# Patient Record
Sex: MALE | Race: WHITE | URBAN - METROPOLITAN AREA
[De-identification: names, ages, dates, MRNs, and addresses within clinical notes are randomized per-mention and may not be internally consistent; named-entity substitution may affect disease eponyms.]

---

## 2017-10-06 ENCOUNTER — EMERGENCY (EMERGENCY)
Facility: HOSPITAL | Age: 72
LOS: 1 days | Discharge: HOME | End: 2017-10-06
Attending: EMERGENCY MEDICINE
Payer: MEDICARE

## 2017-10-06 VITALS
OXYGEN SATURATION: 96 % | DIASTOLIC BLOOD PRESSURE: 71 MMHG | SYSTOLIC BLOOD PRESSURE: 132 MMHG | WEIGHT: 147.05 LBS | TEMPERATURE: 99 F | RESPIRATION RATE: 16 BRPM | HEIGHT: 62 IN | HEART RATE: 79 BPM

## 2017-10-06 VITALS
TEMPERATURE: 98 F | RESPIRATION RATE: 16 BRPM | DIASTOLIC BLOOD PRESSURE: 67 MMHG | HEART RATE: 67 BPM | SYSTOLIC BLOOD PRESSURE: 123 MMHG | OXYGEN SATURATION: 97 %

## 2017-10-06 DIAGNOSIS — E78.5 HYPERLIPIDEMIA, UNSPECIFIED: ICD-10-CM

## 2017-10-06 DIAGNOSIS — E11.9 TYPE 2 DIABETES MELLITUS WITHOUT COMPLICATIONS: ICD-10-CM

## 2017-10-06 DIAGNOSIS — Z79.84 LONG TERM (CURRENT) USE OF ORAL HYPOGLYCEMIC DRUGS: ICD-10-CM

## 2017-10-06 DIAGNOSIS — G44.209 TENSION-TYPE HEADACHE, UNSPECIFIED, NOT INTRACTABLE: ICD-10-CM

## 2017-10-06 DIAGNOSIS — I10 ESSENTIAL (PRIMARY) HYPERTENSION: ICD-10-CM

## 2017-10-06 PROCEDURE — 99285 EMERGENCY DEPT VISIT HI MDM: CPT

## 2017-10-06 PROCEDURE — 99284 EMERGENCY DEPT VISIT MOD MDM: CPT | Mod: 25

## 2017-10-06 PROCEDURE — 96375 TX/PRO/DX INJ NEW DRUG ADDON: CPT

## 2017-10-06 PROCEDURE — 70450 CT HEAD/BRAIN W/O DYE: CPT | Mod: 26

## 2017-10-06 PROCEDURE — 70450 CT HEAD/BRAIN W/O DYE: CPT

## 2017-10-06 PROCEDURE — 96374 THER/PROPH/DIAG INJ IV PUSH: CPT

## 2017-10-06 RX ORDER — PROCHLORPERAZINE MALEATE 5 MG
10 TABLET ORAL ONCE
Qty: 0 | Refills: 0 | Status: DISCONTINUED | OUTPATIENT
Start: 2017-10-06 | End: 2017-10-06

## 2017-10-06 RX ORDER — ACETAMINOPHEN 500 MG
650 TABLET ORAL ONCE
Qty: 0 | Refills: 0 | Status: COMPLETED | OUTPATIENT
Start: 2017-10-06 | End: 2017-10-06

## 2017-10-06 RX ORDER — METOCLOPRAMIDE HCL 10 MG
10 TABLET ORAL ONCE
Qty: 0 | Refills: 0 | Status: COMPLETED | OUTPATIENT
Start: 2017-10-06 | End: 2017-10-06

## 2017-10-06 RX ADMIN — Medication 650 MILLIGRAM(S): at 12:00

## 2017-10-06 RX ADMIN — Medication 10 MILLIGRAM(S): at 12:01

## 2017-10-06 NOTE — ED ADULT NURSE NOTE - PSH
History of inguinal hernia repair  left and right  History of total hip replacement  right- 2005  History of transurethral resection of prostate  2012

## 2017-10-06 NOTE — ED PROVIDER NOTE - MEDICAL DECISION MAKING DETAILS
patient pw ha and neck strain likely tension related. ct scan reassuring. patient feeling better after treatemnt. will dc home with children.

## 2017-10-06 NOTE — ED ADULT NURSE NOTE - OBJECTIVE STATEMENT
73 y/o male came in with c/o headache/neck pain x 2 days. denies any sensitivity to light pt resting at this time with no  signs of any distress noted .

## 2017-10-06 NOTE — ED PROVIDER NOTE - OBJECTIVE STATEMENT
Pertinent PMH/PSH/FHx/SHx and Review of Systems contained within:  72m hx of htn pw ha x2 days mostly posterior and radiating down into the neck and shoulders. notes pain when trying to turn head in the neck muscles. no vision loss, no sensation loss, no difficulty speaking. no nausea, vomiting or fever  Fh and Sh not otherwise contributory  ROS otherwise negative

## 2017-10-06 NOTE — ED ADULT NURSE NOTE - PMH
BPH (benign prostatic hypertrophy)    Diabetes mellitus type II    Diabetic neuropathy    GERD (gastroesophageal reflux disease)    HTN (hypertension)    Hyperlipidemia    Kidney stones    Primary osteoarthritis of right hip

## 2020-01-01 ENCOUNTER — OUTPATIENT (OUTPATIENT)
Dept: OUTPATIENT SERVICES | Facility: HOSPITAL | Age: 75
LOS: 1 days | End: 2020-01-01

## 2020-01-01 ENCOUNTER — INPATIENT (INPATIENT)
Facility: HOSPITAL | Age: 75
LOS: 5 days | Discharge: ANOTHER HOSPITAL | DRG: 871 | End: 2020-04-04
Attending: INTERNAL MEDICINE | Admitting: INTERNAL MEDICINE
Payer: MEDICARE

## 2020-01-01 ENCOUNTER — INPATIENT (INPATIENT)
Facility: HOSPITAL | Age: 75
LOS: 7 days | End: 2020-04-12
Attending: INTERNAL MEDICINE
Payer: MEDICAID

## 2020-01-01 VITALS
OXYGEN SATURATION: 86 % | DIASTOLIC BLOOD PRESSURE: 75 MMHG | HEIGHT: 65 IN | WEIGHT: 139.99 LBS | RESPIRATION RATE: 24 BRPM | SYSTOLIC BLOOD PRESSURE: 146 MMHG | HEART RATE: 119 BPM

## 2020-01-01 VITALS
DIASTOLIC BLOOD PRESSURE: 57 MMHG | HEART RATE: 93 BPM | WEIGHT: 151.46 LBS | SYSTOLIC BLOOD PRESSURE: 124 MMHG | OXYGEN SATURATION: 94 %

## 2020-01-01 DIAGNOSIS — Z29.9 ENCOUNTER FOR PROPHYLACTIC MEASURES, UNSPECIFIED: ICD-10-CM

## 2020-01-01 DIAGNOSIS — N17.9 ACUTE KIDNEY FAILURE, UNSPECIFIED: ICD-10-CM

## 2020-01-01 DIAGNOSIS — J12.9 VIRAL PNEUMONIA, UNSPECIFIED: ICD-10-CM

## 2020-01-01 DIAGNOSIS — J96.01 ACUTE RESPIRATORY FAILURE WITH HYPOXIA: ICD-10-CM

## 2020-01-01 DIAGNOSIS — N40.0 BENIGN PROSTATIC HYPERPLASIA WITHOUT LOWER URINARY TRACT SYMPTOMS: ICD-10-CM

## 2020-01-01 DIAGNOSIS — K21.9 GASTRO-ESOPHAGEAL REFLUX DISEASE WITHOUT ESOPHAGITIS: ICD-10-CM

## 2020-01-01 DIAGNOSIS — F03.90 UNSPECIFIED DEMENTIA WITHOUT BEHAVIORAL DISTURBANCE: ICD-10-CM

## 2020-01-01 DIAGNOSIS — E11.9 TYPE 2 DIABETES MELLITUS WITHOUT COMPLICATIONS: ICD-10-CM

## 2020-01-01 DIAGNOSIS — R09.02 HYPOXEMIA: ICD-10-CM

## 2020-01-01 LAB
24R-OH-CALCIDIOL SERPL-MCNC: 15 NG/ML — LOW (ref 30–80)
ALBUMIN SERPL ELPH-MCNC: 1.2 G/DL — LOW (ref 3.5–5)
ALBUMIN SERPL ELPH-MCNC: 1.4 G/DL — LOW (ref 3.5–5)
ALBUMIN SERPL ELPH-MCNC: 1.6 G/DL — LOW (ref 3.5–5)
ALBUMIN SERPL ELPH-MCNC: 1.9 G/DL — LOW (ref 3.5–5)
ALBUMIN SERPL ELPH-MCNC: 2.2 G/DL — LOW (ref 3.5–5)
ALBUMIN SERPL ELPH-MCNC: 2.3 G/DL — LOW (ref 3.5–5)
ALP SERPL-CCNC: 101 U/L — SIGNIFICANT CHANGE UP (ref 40–120)
ALP SERPL-CCNC: 106 U/L — SIGNIFICANT CHANGE UP (ref 40–120)
ALP SERPL-CCNC: 109 U/L — SIGNIFICANT CHANGE UP (ref 40–120)
ALP SERPL-CCNC: 142 U/L — HIGH (ref 40–120)
ALP SERPL-CCNC: 144 U/L — HIGH (ref 40–120)
ALP SERPL-CCNC: 187 U/L — HIGH (ref 40–120)
ALT FLD-CCNC: 23 U/L DA — SIGNIFICANT CHANGE UP (ref 10–60)
ALT FLD-CCNC: 26 U/L DA — SIGNIFICANT CHANGE UP (ref 10–60)
ALT FLD-CCNC: 35 U/L DA — SIGNIFICANT CHANGE UP (ref 10–60)
ALT FLD-CCNC: 36 U/L DA — SIGNIFICANT CHANGE UP (ref 10–60)
ALT FLD-CCNC: 57 U/L DA — SIGNIFICANT CHANGE UP (ref 10–60)
ALT FLD-CCNC: 66 U/L DA — HIGH (ref 10–60)
ANION GAP SERPL CALC-SCNC: 11 MMOL/L — SIGNIFICANT CHANGE UP (ref 5–17)
ANION GAP SERPL CALC-SCNC: 11 MMOL/L — SIGNIFICANT CHANGE UP (ref 5–17)
ANION GAP SERPL CALC-SCNC: 13 MMOL/L — SIGNIFICANT CHANGE UP (ref 5–17)
ANION GAP SERPL CALC-SCNC: 13 MMOL/L — SIGNIFICANT CHANGE UP (ref 5–17)
ANION GAP SERPL CALC-SCNC: 14 MMOL/L — SIGNIFICANT CHANGE UP (ref 5–17)
ANION GAP SERPL CALC-SCNC: 14 MMOL/L — SIGNIFICANT CHANGE UP (ref 5–17)
ANISOCYTOSIS BLD QL: SLIGHT — SIGNIFICANT CHANGE UP
APPEARANCE UR: CLEAR — SIGNIFICANT CHANGE UP
APTT BLD: 33.6 SEC — SIGNIFICANT CHANGE UP (ref 27.5–36.3)
AST SERPL-CCNC: 126 U/L — HIGH (ref 10–40)
AST SERPL-CCNC: 41 U/L — HIGH (ref 10–40)
AST SERPL-CCNC: 43 U/L — HIGH (ref 10–40)
AST SERPL-CCNC: 59 U/L — HIGH (ref 10–40)
AST SERPL-CCNC: 69 U/L — HIGH (ref 10–40)
AST SERPL-CCNC: 96 U/L — HIGH (ref 10–40)
BASE EXCESS BLDA CALC-SCNC: -1.3 MMOL/L — SIGNIFICANT CHANGE UP (ref -2–2)
BASE EXCESS BLDA CALC-SCNC: -1.4 MMOL/L — SIGNIFICANT CHANGE UP (ref -2–2)
BASE EXCESS BLDA CALC-SCNC: -5.1 MMOL/L — LOW (ref -2–2)
BASE EXCESS BLDA CALC-SCNC: -6.1 MMOL/L — LOW (ref -2–2)
BASE EXCESS BLDA CALC-SCNC: -6.5 MMOL/L — LOW (ref -2–2)
BASE EXCESS BLDA CALC-SCNC: -7.9 MMOL/L — LOW (ref -2–2)
BASE EXCESS BLDA CALC-SCNC: -8.6 MMOL/L — LOW (ref -2–2)
BASE EXCESS BLDA CALC-SCNC: -9.5 MMOL/L — LOW (ref -2–2)
BASE EXCESS BLDA CALC-SCNC: 0.8 MMOL/L — SIGNIFICANT CHANGE UP (ref -2–2)
BASE EXCESS BLDA CALC-SCNC: SIGNIFICANT CHANGE UP MMOL/L (ref -2–2)
BASOPHILS # BLD AUTO: 0.01 K/UL — SIGNIFICANT CHANGE UP (ref 0–0.2)
BASOPHILS # BLD AUTO: 0.02 K/UL — SIGNIFICANT CHANGE UP (ref 0–0.2)
BASOPHILS # BLD AUTO: 0.03 K/UL — SIGNIFICANT CHANGE UP (ref 0–0.2)
BASOPHILS NFR BLD AUTO: 0.1 % — SIGNIFICANT CHANGE UP (ref 0–2)
BASOPHILS NFR BLD AUTO: 0.2 % — SIGNIFICANT CHANGE UP (ref 0–2)
BASOPHILS NFR BLD AUTO: 0.3 % — SIGNIFICANT CHANGE UP (ref 0–2)
BILIRUB SERPL-MCNC: 0.7 MG/DL — SIGNIFICANT CHANGE UP (ref 0.2–1.2)
BILIRUB SERPL-MCNC: 0.8 MG/DL — SIGNIFICANT CHANGE UP (ref 0.2–1.2)
BILIRUB SERPL-MCNC: 0.8 MG/DL — SIGNIFICANT CHANGE UP (ref 0.2–1.2)
BILIRUB SERPL-MCNC: 1 MG/DL — SIGNIFICANT CHANGE UP (ref 0.2–1.2)
BILIRUB UR-MCNC: NEGATIVE — SIGNIFICANT CHANGE UP
BLOOD GAS COMMENTS ARTERIAL: SIGNIFICANT CHANGE UP
BUN SERPL-MCNC: 27 MG/DL — HIGH (ref 7–18)
BUN SERPL-MCNC: 37 MG/DL — HIGH (ref 7–18)
BUN SERPL-MCNC: 37 MG/DL — HIGH (ref 7–18)
BUN SERPL-MCNC: 38 MG/DL — HIGH (ref 7–18)
BUN SERPL-MCNC: 49 MG/DL — HIGH (ref 7–18)
BUN SERPL-MCNC: 74 MG/DL — HIGH (ref 7–18)
CALCIUM SERPL-MCNC: 7.8 MG/DL — LOW (ref 8.4–10.5)
CALCIUM SERPL-MCNC: 7.8 MG/DL — LOW (ref 8.4–10.5)
CALCIUM SERPL-MCNC: 8 MG/DL — LOW (ref 8.4–10.5)
CALCIUM SERPL-MCNC: 8.3 MG/DL — LOW (ref 8.4–10.5)
CALCIUM SERPL-MCNC: 8.4 MG/DL — SIGNIFICANT CHANGE UP (ref 8.4–10.5)
CALCIUM SERPL-MCNC: 8.7 MG/DL — SIGNIFICANT CHANGE UP (ref 8.4–10.5)
CHLORIDE SERPL-SCNC: 102 MMOL/L — SIGNIFICANT CHANGE UP (ref 96–108)
CHLORIDE SERPL-SCNC: 103 MMOL/L — SIGNIFICANT CHANGE UP (ref 96–108)
CHLORIDE SERPL-SCNC: 111 MMOL/L — HIGH (ref 96–108)
CHLORIDE SERPL-SCNC: 112 MMOL/L — HIGH (ref 96–108)
CHOLEST SERPL-MCNC: 111 MG/DL — SIGNIFICANT CHANGE UP (ref 10–199)
CO2 SERPL-SCNC: 16 MMOL/L — LOW (ref 22–31)
CO2 SERPL-SCNC: 17 MMOL/L — LOW (ref 22–31)
CO2 SERPL-SCNC: 17 MMOL/L — LOW (ref 22–31)
CO2 SERPL-SCNC: 19 MMOL/L — LOW (ref 22–31)
CO2 SERPL-SCNC: 20 MMOL/L — LOW (ref 22–31)
CO2 SERPL-SCNC: 23 MMOL/L — SIGNIFICANT CHANGE UP (ref 22–31)
COLOR SPEC: YELLOW — SIGNIFICANT CHANGE UP
CREAT ?TM UR-MCNC: 130 MG/DL — SIGNIFICANT CHANGE UP
CREAT SERPL-MCNC: 1.1 MG/DL — SIGNIFICANT CHANGE UP (ref 0.5–1.3)
CREAT SERPL-MCNC: 1.17 MG/DL — SIGNIFICANT CHANGE UP (ref 0.5–1.3)
CREAT SERPL-MCNC: 1.54 MG/DL — HIGH (ref 0.5–1.3)
CREAT SERPL-MCNC: 2.18 MG/DL — HIGH (ref 0.5–1.3)
CREAT SERPL-MCNC: 3.4 MG/DL — HIGH (ref 0.5–1.3)
CREAT SERPL-MCNC: 4.05 MG/DL — HIGH (ref 0.5–1.3)
CRP SERPL-MCNC: 23.05 MG/DL — HIGH (ref 0–0.4)
CRP SERPL-MCNC: 32.89 MG/DL — HIGH (ref 0–0.4)
CRP SERPL-MCNC: 34.24 MG/DL — HIGH (ref 0–0.4)
CRP SERPL-MCNC: 37.93 MG/DL — HIGH (ref 0–0.4)
CULTURE RESULTS: SIGNIFICANT CHANGE UP
D DIMER BLD IA.RAPID-MCNC: 2493 NG/ML DDU — HIGH
D DIMER BLD IA.RAPID-MCNC: 2895 NG/ML DDU — HIGH
DIFF PNL FLD: ABNORMAL
EOSINOPHIL # BLD AUTO: 0 K/UL — SIGNIFICANT CHANGE UP (ref 0–0.5)
EOSINOPHIL # BLD AUTO: 0 K/UL — SIGNIFICANT CHANGE UP (ref 0–0.5)
EOSINOPHIL # BLD AUTO: 0.01 K/UL — SIGNIFICANT CHANGE UP (ref 0–0.5)
EOSINOPHIL # BLD AUTO: 0.01 K/UL — SIGNIFICANT CHANGE UP (ref 0–0.5)
EOSINOPHIL # BLD AUTO: 0.21 K/UL — SIGNIFICANT CHANGE UP (ref 0–0.5)
EOSINOPHIL NFR BLD AUTO: 0 % — SIGNIFICANT CHANGE UP (ref 0–6)
EOSINOPHIL NFR BLD AUTO: 0 % — SIGNIFICANT CHANGE UP (ref 0–6)
EOSINOPHIL NFR BLD AUTO: 0.1 % — SIGNIFICANT CHANGE UP (ref 0–6)
EOSINOPHIL NFR BLD AUTO: 0.1 % — SIGNIFICANT CHANGE UP (ref 0–6)
EOSINOPHIL NFR BLD AUTO: 2.2 % — SIGNIFICANT CHANGE UP (ref 0–6)
ERYTHROCYTE [SEDIMENTATION RATE] IN BLOOD: 54 MM/HR — HIGH (ref 0–20)
ERYTHROCYTE [SEDIMENTATION RATE] IN BLOOD: 91 MM/HR — HIGH (ref 0–20)
FERRITIN SERPL-MCNC: 6255 NG/ML — HIGH (ref 30–400)
FLU A RESULT: SIGNIFICANT CHANGE UP
FLU A RESULT: SIGNIFICANT CHANGE UP
FLUAV AG NPH QL: SIGNIFICANT CHANGE UP
FLUBV AG NPH QL: SIGNIFICANT CHANGE UP
FOLATE SERPL-MCNC: 17.1 NG/ML — SIGNIFICANT CHANGE UP
GIANT PLATELETS BLD QL SMEAR: PRESENT — SIGNIFICANT CHANGE UP
GLUCOSE BLDC GLUCOMTR-MCNC: 100 MG/DL — HIGH (ref 70–99)
GLUCOSE BLDC GLUCOMTR-MCNC: 103 MG/DL — HIGH (ref 70–99)
GLUCOSE BLDC GLUCOMTR-MCNC: 111 MG/DL — HIGH (ref 70–99)
GLUCOSE BLDC GLUCOMTR-MCNC: 116 MG/DL — HIGH (ref 70–99)
GLUCOSE BLDC GLUCOMTR-MCNC: 121 MG/DL — HIGH (ref 70–99)
GLUCOSE BLDC GLUCOMTR-MCNC: 122 MG/DL — HIGH (ref 70–99)
GLUCOSE BLDC GLUCOMTR-MCNC: 207 MG/DL — HIGH (ref 70–99)
GLUCOSE BLDC GLUCOMTR-MCNC: 233 MG/DL — HIGH (ref 70–99)
GLUCOSE BLDC GLUCOMTR-MCNC: 263 MG/DL — HIGH (ref 70–99)
GLUCOSE BLDC GLUCOMTR-MCNC: 320 MG/DL — HIGH (ref 70–99)
GLUCOSE SERPL-MCNC: 110 MG/DL — HIGH (ref 70–99)
GLUCOSE SERPL-MCNC: 158 MG/DL — HIGH (ref 70–99)
GLUCOSE SERPL-MCNC: 167 MG/DL — HIGH (ref 70–99)
GLUCOSE SERPL-MCNC: 288 MG/DL — HIGH (ref 70–99)
GLUCOSE SERPL-MCNC: 295 MG/DL — HIGH (ref 70–99)
GLUCOSE SERPL-MCNC: 340 MG/DL — HIGH (ref 70–99)
GLUCOSE UR QL: 1000 MG/DL
HBA1C BLD-MCNC: 10.9 % — HIGH (ref 4–5.6)
HBA1C BLD-MCNC: 11.4 % — HIGH (ref 4–5.6)
HCO3 BLDA-SCNC: 18 MMOL/L — LOW (ref 23–27)
HCO3 BLDA-SCNC: 18 MMOL/L — LOW (ref 23–27)
HCO3 BLDA-SCNC: 19 MMOL/L — LOW (ref 23–27)
HCO3 BLDA-SCNC: 19 MMOL/L — LOW (ref 23–27)
HCO3 BLDA-SCNC: 20 MMOL/L — LOW (ref 23–27)
HCO3 BLDA-SCNC: 21 MMOL/L — LOW (ref 23–27)
HCO3 BLDA-SCNC: 23 MMOL/L — SIGNIFICANT CHANGE UP (ref 23–27)
HCT VFR BLD CALC: 37.7 % — LOW (ref 39–50)
HCT VFR BLD CALC: 42.3 % — SIGNIFICANT CHANGE UP (ref 39–50)
HCT VFR BLD CALC: 45.1 % — SIGNIFICANT CHANGE UP (ref 39–50)
HCT VFR BLD CALC: 45.8 % — SIGNIFICANT CHANGE UP (ref 39–50)
HCT VFR BLD CALC: 48.5 % — SIGNIFICANT CHANGE UP (ref 39–50)
HCT VFR BLD CALC: 49.4 % — SIGNIFICANT CHANGE UP (ref 39–50)
HCV AB S/CO SERPL IA: 0.13 S/CO — SIGNIFICANT CHANGE UP (ref 0–0.99)
HCV AB SERPL-IMP: SIGNIFICANT CHANGE UP
HDLC SERPL-MCNC: 19 MG/DL — LOW
HGB BLD-MCNC: 12 G/DL — LOW (ref 13–17)
HGB BLD-MCNC: 13 G/DL — SIGNIFICANT CHANGE UP (ref 13–17)
HGB BLD-MCNC: 13.9 G/DL — SIGNIFICANT CHANGE UP (ref 13–17)
HGB BLD-MCNC: 15.2 G/DL — SIGNIFICANT CHANGE UP (ref 13–17)
HGB BLD-MCNC: 16.3 G/DL — SIGNIFICANT CHANGE UP (ref 13–17)
HGB BLD-MCNC: 16.5 G/DL — SIGNIFICANT CHANGE UP (ref 13–17)
HOROWITZ INDEX BLDA+IHG-RTO: 100 — SIGNIFICANT CHANGE UP
HOROWITZ INDEX BLDA+IHG-RTO: 40 — SIGNIFICANT CHANGE UP
HOROWITZ INDEX BLDA+IHG-RTO: 50 — SIGNIFICANT CHANGE UP
HOROWITZ INDEX BLDA+IHG-RTO: 60 — SIGNIFICANT CHANGE UP
IMM GRANULOCYTES NFR BLD AUTO: 1.7 % — HIGH (ref 0–1.5)
IMM GRANULOCYTES NFR BLD AUTO: 1.8 % — HIGH (ref 0–1.5)
IMM GRANULOCYTES NFR BLD AUTO: 2.3 % — HIGH (ref 0–1.5)
IMM GRANULOCYTES NFR BLD AUTO: 2.6 % — HIGH (ref 0–1.5)
IMM GRANULOCYTES NFR BLD AUTO: 3.2 % — HIGH (ref 0–1.5)
INR BLD: 1.19 RATIO — HIGH (ref 0.88–1.16)
KETONES UR-MCNC: ABNORMAL
LACTATE SERPL-SCNC: 2.1 MMOL/L — HIGH (ref 0.7–2)
LDH SERPL L TO P-CCNC: 788 U/L — HIGH (ref 120–225)
LEUKOCYTE ESTERASE UR-ACNC: NEGATIVE — SIGNIFICANT CHANGE UP
LIPID PNL WITH DIRECT LDL SERPL: 48 MG/DL — SIGNIFICANT CHANGE UP
LYMPHOCYTES # BLD AUTO: 0.26 K/UL — LOW (ref 1–3.3)
LYMPHOCYTES # BLD AUTO: 0.63 K/UL — LOW (ref 1–3.3)
LYMPHOCYTES # BLD AUTO: 0.85 K/UL — LOW (ref 1–3.3)
LYMPHOCYTES # BLD AUTO: 0.91 K/UL — LOW (ref 1–3.3)
LYMPHOCYTES # BLD AUTO: 0.96 K/UL — LOW (ref 1–3.3)
LYMPHOCYTES # BLD AUTO: 11.1 % — LOW (ref 13–44)
LYMPHOCYTES # BLD AUTO: 11.6 % — LOW (ref 13–44)
LYMPHOCYTES # BLD AUTO: 13.7 % — SIGNIFICANT CHANGE UP (ref 13–44)
LYMPHOCYTES # BLD AUTO: 2.8 % — LOW (ref 13–44)
LYMPHOCYTES # BLD AUTO: 6.7 % — LOW (ref 13–44)
MACROCYTES BLD QL: SLIGHT — SIGNIFICANT CHANGE UP
MAGNESIUM SERPL-MCNC: 2.7 MG/DL — HIGH (ref 1.6–2.6)
MAGNESIUM SERPL-MCNC: 2.8 MG/DL — HIGH (ref 1.6–2.6)
MAGNESIUM SERPL-MCNC: 2.8 MG/DL — HIGH (ref 1.6–2.6)
MAGNESIUM SERPL-MCNC: 3 MG/DL — HIGH (ref 1.6–2.6)
MAGNESIUM SERPL-MCNC: 3.2 MG/DL — HIGH (ref 1.6–2.6)
MANUAL SMEAR VERIFICATION: SIGNIFICANT CHANGE UP
MCHC RBC-ENTMCNC: 29.3 PG — SIGNIFICANT CHANGE UP (ref 27–34)
MCHC RBC-ENTMCNC: 29.6 PG — SIGNIFICANT CHANGE UP (ref 27–34)
MCHC RBC-ENTMCNC: 29.7 PG — SIGNIFICANT CHANGE UP (ref 27–34)
MCHC RBC-ENTMCNC: 29.7 PG — SIGNIFICANT CHANGE UP (ref 27–34)
MCHC RBC-ENTMCNC: 29.9 PG — SIGNIFICANT CHANGE UP (ref 27–34)
MCHC RBC-ENTMCNC: 30.1 PG — SIGNIFICANT CHANGE UP (ref 27–34)
MCHC RBC-ENTMCNC: 30.7 GM/DL — LOW (ref 32–36)
MCHC RBC-ENTMCNC: 30.8 GM/DL — LOW (ref 32–36)
MCHC RBC-ENTMCNC: 31.8 GM/DL — LOW (ref 32–36)
MCHC RBC-ENTMCNC: 33.2 GM/DL — SIGNIFICANT CHANGE UP (ref 32–36)
MCHC RBC-ENTMCNC: 33.4 GM/DL — SIGNIFICANT CHANGE UP (ref 32–36)
MCHC RBC-ENTMCNC: 33.6 GM/DL — SIGNIFICANT CHANGE UP (ref 32–36)
MCV RBC AUTO: 88.2 FL — SIGNIFICANT CHANGE UP (ref 80–100)
MCV RBC AUTO: 90 FL — SIGNIFICANT CHANGE UP (ref 80–100)
MCV RBC AUTO: 90 FL — SIGNIFICANT CHANGE UP (ref 80–100)
MCV RBC AUTO: 93.3 FL — SIGNIFICANT CHANGE UP (ref 80–100)
MCV RBC AUTO: 95.1 FL — SIGNIFICANT CHANGE UP (ref 80–100)
MCV RBC AUTO: 96.8 FL — SIGNIFICANT CHANGE UP (ref 80–100)
MICROCYTES BLD QL: SLIGHT — SIGNIFICANT CHANGE UP
MONOCYTES # BLD AUTO: 0.4 K/UL — SIGNIFICANT CHANGE UP (ref 0–0.9)
MONOCYTES # BLD AUTO: 0.48 K/UL — SIGNIFICANT CHANGE UP (ref 0–0.9)
MONOCYTES # BLD AUTO: 0.61 K/UL — SIGNIFICANT CHANGE UP (ref 0–0.9)
MONOCYTES # BLD AUTO: 0.68 K/UL — SIGNIFICANT CHANGE UP (ref 0–0.9)
MONOCYTES # BLD AUTO: 0.68 K/UL — SIGNIFICANT CHANGE UP (ref 0–0.9)
MONOCYTES NFR BLD AUTO: 10.2 % — SIGNIFICANT CHANGE UP (ref 2–14)
MONOCYTES NFR BLD AUTO: 4.3 % — SIGNIFICANT CHANGE UP (ref 2–14)
MONOCYTES NFR BLD AUTO: 6.3 % — SIGNIFICANT CHANGE UP (ref 2–14)
MONOCYTES NFR BLD AUTO: 7.2 % — SIGNIFICANT CHANGE UP (ref 2–14)
MONOCYTES NFR BLD AUTO: 7.4 % — SIGNIFICANT CHANGE UP (ref 2–14)
NEUTROPHILS # BLD AUTO: 4.91 K/UL — SIGNIFICANT CHANGE UP (ref 1.8–7.4)
NEUTROPHILS # BLD AUTO: 6.12 K/UL — SIGNIFICANT CHANGE UP (ref 1.8–7.4)
NEUTROPHILS # BLD AUTO: 6.52 K/UL — SIGNIFICANT CHANGE UP (ref 1.8–7.4)
NEUTROPHILS # BLD AUTO: 7.56 K/UL — HIGH (ref 1.8–7.4)
NEUTROPHILS # BLD AUTO: 8.48 K/UL — HIGH (ref 1.8–7.4)
NEUTROPHILS NFR BLD AUTO: 74 % — SIGNIFICANT CHANGE UP (ref 43–77)
NEUTROPHILS NFR BLD AUTO: 79 % — HIGH (ref 43–77)
NEUTROPHILS NFR BLD AUTO: 79.9 % — HIGH (ref 43–77)
NEUTROPHILS NFR BLD AUTO: 80.4 % — HIGH (ref 43–77)
NEUTROPHILS NFR BLD AUTO: 90.2 % — HIGH (ref 43–77)
NITRITE UR-MCNC: NEGATIVE — SIGNIFICANT CHANGE UP
NRBC # BLD: 0 /100 WBCS — SIGNIFICANT CHANGE UP (ref 0–0)
PCO2 BLDA: 27 MMHG — LOW (ref 32–46)
PCO2 BLDA: 28 MMHG — LOW (ref 32–46)
PCO2 BLDA: 32 MMHG — SIGNIFICANT CHANGE UP (ref 32–46)
PCO2 BLDA: 37 MMHG — SIGNIFICANT CHANGE UP (ref 32–46)
PCO2 BLDA: 38 MMHG — SIGNIFICANT CHANGE UP (ref 32–46)
PCO2 BLDA: 40 MMHG — SIGNIFICANT CHANGE UP (ref 32–46)
PCO2 BLDA: 48 MMHG — HIGH (ref 32–46)
PCO2 BLDA: 48 MMHG — HIGH (ref 32–46)
PCO2 BLDA: 50 MMHG — HIGH (ref 32–46)
PCO2 BLDA: 52 MMHG — HIGH (ref 32–46)
PH BLDA: 7.2 — CRITICAL LOW (ref 7.35–7.45)
PH BLDA: 7.2 — CRITICAL LOW (ref 7.35–7.45)
PH BLDA: 7.21 — LOW (ref 7.35–7.45)
PH BLDA: 7.26 — LOW (ref 7.35–7.45)
PH BLDA: 7.26 — LOW (ref 7.35–7.45)
PH BLDA: 7.31 — LOW (ref 7.35–7.45)
PH BLDA: 7.34 — LOW (ref 7.35–7.45)
PH BLDA: 7.47 — HIGH (ref 7.35–7.45)
PH BLDA: 7.48 — HIGH (ref 7.35–7.45)
PH BLDA: 7.49 — HIGH (ref 7.35–7.45)
PH UR: 5 — SIGNIFICANT CHANGE UP (ref 5–8)
PHOSPHATE SERPL-MCNC: 3.1 MG/DL — SIGNIFICANT CHANGE UP (ref 2.5–4.5)
PHOSPHATE SERPL-MCNC: 3.6 MG/DL — SIGNIFICANT CHANGE UP (ref 2.5–4.5)
PHOSPHATE SERPL-MCNC: 5.7 MG/DL — HIGH (ref 2.5–4.5)
PHOSPHATE SERPL-MCNC: 6 MG/DL — HIGH (ref 2.5–4.5)
PHOSPHATE SERPL-MCNC: 6.1 MG/DL — HIGH (ref 2.5–4.5)
PLAT MORPH BLD: NORMAL — SIGNIFICANT CHANGE UP
PLATELET # BLD AUTO: 286 K/UL — SIGNIFICANT CHANGE UP (ref 150–400)
PLATELET # BLD AUTO: 333 K/UL — SIGNIFICANT CHANGE UP (ref 150–400)
PLATELET # BLD AUTO: 420 K/UL — HIGH (ref 150–400)
PLATELET # BLD AUTO: 483 K/UL — HIGH (ref 150–400)
PLATELET # BLD AUTO: 524 K/UL — HIGH (ref 150–400)
PLATELET # BLD AUTO: 552 K/UL — HIGH (ref 150–400)
PO2 BLDA: 118 MMHG — HIGH (ref 74–108)
PO2 BLDA: 258 MMHG — HIGH (ref 74–108)
PO2 BLDA: 41 MMHG — CRITICAL LOW (ref 74–108)
PO2 BLDA: 67 MMHG — LOW (ref 74–108)
PO2 BLDA: 71 MMHG — LOW (ref 74–108)
PO2 BLDA: 79 MMHG — SIGNIFICANT CHANGE UP (ref 74–108)
PO2 BLDA: 85 MMHG — SIGNIFICANT CHANGE UP (ref 74–108)
PO2 BLDA: 92 MMHG — SIGNIFICANT CHANGE UP (ref 74–108)
PO2 BLDA: 94 MMHG — SIGNIFICANT CHANGE UP (ref 74–108)
PO2 BLDA: 98 MMHG — SIGNIFICANT CHANGE UP (ref 74–108)
POIKILOCYTOSIS BLD QL AUTO: SLIGHT — SIGNIFICANT CHANGE UP
POTASSIUM SERPL-MCNC: 4.1 MMOL/L — SIGNIFICANT CHANGE UP (ref 3.5–5.3)
POTASSIUM SERPL-MCNC: 4.6 MMOL/L — SIGNIFICANT CHANGE UP (ref 3.5–5.3)
POTASSIUM SERPL-MCNC: 5.2 MMOL/L — SIGNIFICANT CHANGE UP (ref 3.5–5.3)
POTASSIUM SERPL-SCNC: 4.1 MMOL/L — SIGNIFICANT CHANGE UP (ref 3.5–5.3)
POTASSIUM SERPL-SCNC: 4.6 MMOL/L — SIGNIFICANT CHANGE UP (ref 3.5–5.3)
POTASSIUM SERPL-SCNC: 5.2 MMOL/L — SIGNIFICANT CHANGE UP (ref 3.5–5.3)
PROT SERPL-MCNC: 6 G/DL — SIGNIFICANT CHANGE UP (ref 6–8.3)
PROT SERPL-MCNC: 6.1 G/DL — SIGNIFICANT CHANGE UP (ref 6–8.3)
PROT SERPL-MCNC: 6.4 G/DL — SIGNIFICANT CHANGE UP (ref 6–8.3)
PROT SERPL-MCNC: 6.5 G/DL — SIGNIFICANT CHANGE UP (ref 6–8.3)
PROT SERPL-MCNC: 6.8 G/DL — SIGNIFICANT CHANGE UP (ref 6–8.3)
PROT SERPL-MCNC: 7.3 G/DL — SIGNIFICANT CHANGE UP (ref 6–8.3)
PROT UR-MCNC: 100
PROTHROM AB SERPL-ACNC: 13.5 SEC — HIGH (ref 10–12.9)
RBC # BLD: 4.04 M/UL — LOW (ref 4.2–5.8)
RBC # BLD: 4.37 M/UL — SIGNIFICANT CHANGE UP (ref 4.2–5.8)
RBC # BLD: 4.74 M/UL — SIGNIFICANT CHANGE UP (ref 4.2–5.8)
RBC # BLD: 5.09 M/UL — SIGNIFICANT CHANGE UP (ref 4.2–5.8)
RBC # BLD: 5.49 M/UL — SIGNIFICANT CHANGE UP (ref 4.2–5.8)
RBC # BLD: 5.5 M/UL — SIGNIFICANT CHANGE UP (ref 4.2–5.8)
RBC # FLD: 14.2 % — SIGNIFICANT CHANGE UP (ref 10.3–14.5)
RBC # FLD: 14.6 % — HIGH (ref 10.3–14.5)
RBC # FLD: 14.6 % — HIGH (ref 10.3–14.5)
RBC # FLD: 15.9 % — HIGH (ref 10.3–14.5)
RBC # FLD: 16.3 % — HIGH (ref 10.3–14.5)
RBC # FLD: 16.5 % — HIGH (ref 10.3–14.5)
RBC BLD AUTO: ABNORMAL
RSV RESULT: SIGNIFICANT CHANGE UP
RSV RNA RESP QL NAA+PROBE: SIGNIFICANT CHANGE UP
SAO2 % BLDA: 76 % — LOW (ref 92–96)
SAO2 % BLDA: 92 % — SIGNIFICANT CHANGE UP (ref 92–96)
SAO2 % BLDA: 93 % — SIGNIFICANT CHANGE UP (ref 92–96)
SAO2 % BLDA: 95 % — SIGNIFICANT CHANGE UP (ref 92–96)
SAO2 % BLDA: 96 % — SIGNIFICANT CHANGE UP (ref 92–96)
SAO2 % BLDA: 98 % — HIGH (ref 92–96)
SAO2 % BLDA: SIGNIFICANT CHANGE UP % (ref 92–96)
SARS-COV-2 RNA SPEC QL NAA+PROBE: DETECTED
SODIUM SERPL-SCNC: 134 MMOL/L — LOW (ref 135–145)
SODIUM SERPL-SCNC: 137 MMOL/L — SIGNIFICANT CHANGE UP (ref 135–145)
SODIUM SERPL-SCNC: 140 MMOL/L — SIGNIFICANT CHANGE UP (ref 135–145)
SODIUM SERPL-SCNC: 142 MMOL/L — SIGNIFICANT CHANGE UP (ref 135–145)
SODIUM SERPL-SCNC: 142 MMOL/L — SIGNIFICANT CHANGE UP (ref 135–145)
SODIUM SERPL-SCNC: 143 MMOL/L — SIGNIFICANT CHANGE UP (ref 135–145)
SODIUM UR-SCNC: 21 MMOL/L — SIGNIFICANT CHANGE UP
SP GR SPEC: 1.02 — SIGNIFICANT CHANGE UP (ref 1.01–1.02)
SPECIMEN SOURCE: SIGNIFICANT CHANGE UP
SPHEROCYTES BLD QL SMEAR: SLIGHT — SIGNIFICANT CHANGE UP
TOTAL CHOLESTEROL/HDL RATIO MEASUREMENT: 5.8 RATIO — SIGNIFICANT CHANGE UP (ref 3.4–9.6)
TRIGL SERPL-MCNC: 218 MG/DL — HIGH (ref 10–149)
TROPONIN I SERPL-MCNC: 0.09 NG/ML — HIGH (ref 0–0.04)
TROPONIN I SERPL-MCNC: 0.12 NG/ML — HIGH (ref 0–0.04)
TSH SERPL-MCNC: 2.46 UU/ML — SIGNIFICANT CHANGE UP (ref 0.34–4.82)
UROBILINOGEN FLD QL: NEGATIVE — SIGNIFICANT CHANGE UP
VIT B12 SERPL-MCNC: 1988 PG/ML — HIGH (ref 232–1245)
WBC # BLD: 10.83 K/UL — HIGH (ref 3.8–10.5)
WBC # BLD: 6.64 K/UL — SIGNIFICANT CHANGE UP (ref 3.8–10.5)
WBC # BLD: 7.66 K/UL — SIGNIFICANT CHANGE UP (ref 3.8–10.5)
WBC # BLD: 8.26 K/UL — SIGNIFICANT CHANGE UP (ref 3.8–10.5)
WBC # BLD: 9.39 K/UL — SIGNIFICANT CHANGE UP (ref 3.8–10.5)
WBC # BLD: 9.41 K/UL — SIGNIFICANT CHANGE UP (ref 3.8–10.5)
WBC # FLD AUTO: 10.83 K/UL — HIGH (ref 3.8–10.5)
WBC # FLD AUTO: 6.64 K/UL — SIGNIFICANT CHANGE UP (ref 3.8–10.5)
WBC # FLD AUTO: 7.66 K/UL — SIGNIFICANT CHANGE UP (ref 3.8–10.5)
WBC # FLD AUTO: 8.26 K/UL — SIGNIFICANT CHANGE UP (ref 3.8–10.5)
WBC # FLD AUTO: 9.39 K/UL — SIGNIFICANT CHANGE UP (ref 3.8–10.5)
WBC # FLD AUTO: 9.41 K/UL — SIGNIFICANT CHANGE UP (ref 3.8–10.5)

## 2020-01-01 PROCEDURE — 93970 EXTREMITY STUDY: CPT | Mod: 26

## 2020-01-01 PROCEDURE — 71045 X-RAY EXAM CHEST 1 VIEW: CPT | Mod: 26

## 2020-01-01 PROCEDURE — 71045 X-RAY EXAM CHEST 1 VIEW: CPT

## 2020-01-01 PROCEDURE — 93005 ELECTROCARDIOGRAM TRACING: CPT

## 2020-01-01 PROCEDURE — 87040 BLOOD CULTURE FOR BACTERIA: CPT

## 2020-01-01 PROCEDURE — 87631 RESP VIRUS 3-5 TARGETS: CPT

## 2020-01-01 PROCEDURE — 84443 ASSAY THYROID STIM HORMONE: CPT

## 2020-01-01 PROCEDURE — 99233 SBSQ HOSP IP/OBS HIGH 50: CPT | Mod: GC

## 2020-01-01 PROCEDURE — 82803 BLOOD GASES ANY COMBINATION: CPT

## 2020-01-01 PROCEDURE — 83605 ASSAY OF LACTIC ACID: CPT

## 2020-01-01 PROCEDURE — 81001 URINALYSIS AUTO W/SCOPE: CPT

## 2020-01-01 PROCEDURE — 85730 THROMBOPLASTIN TIME PARTIAL: CPT

## 2020-01-01 PROCEDURE — 80053 COMPREHEN METABOLIC PANEL: CPT

## 2020-01-01 PROCEDURE — 87635 SARS-COV-2 COVID-19 AMP PRB: CPT

## 2020-01-01 PROCEDURE — 94003 VENT MGMT INPAT SUBQ DAY: CPT

## 2020-01-01 PROCEDURE — 83615 LACTATE (LD) (LDH) ENZYME: CPT

## 2020-01-01 PROCEDURE — 82962 GLUCOSE BLOOD TEST: CPT

## 2020-01-01 PROCEDURE — 87086 URINE CULTURE/COLONY COUNT: CPT

## 2020-01-01 PROCEDURE — 82728 ASSAY OF FERRITIN: CPT

## 2020-01-01 PROCEDURE — 99285 EMERGENCY DEPT VISIT HI MDM: CPT

## 2020-01-01 PROCEDURE — 99291 CRITICAL CARE FIRST HOUR: CPT

## 2020-01-01 PROCEDURE — 82607 VITAMIN B-12: CPT

## 2020-01-01 PROCEDURE — 99222 1ST HOSP IP/OBS MODERATE 55: CPT | Mod: AI,GC

## 2020-01-01 PROCEDURE — 83735 ASSAY OF MAGNESIUM: CPT

## 2020-01-01 PROCEDURE — 85652 RBC SED RATE AUTOMATED: CPT

## 2020-01-01 PROCEDURE — 36415 COLL VENOUS BLD VENIPUNCTURE: CPT

## 2020-01-01 PROCEDURE — 80061 LIPID PANEL: CPT

## 2020-01-01 PROCEDURE — 84300 ASSAY OF URINE SODIUM: CPT

## 2020-01-01 PROCEDURE — 82306 VITAMIN D 25 HYDROXY: CPT

## 2020-01-01 PROCEDURE — 84484 ASSAY OF TROPONIN QUANT: CPT

## 2020-01-01 PROCEDURE — 85027 COMPLETE CBC AUTOMATED: CPT

## 2020-01-01 PROCEDURE — 86140 C-REACTIVE PROTEIN: CPT

## 2020-01-01 PROCEDURE — 85379 FIBRIN DEGRADATION QUANT: CPT

## 2020-01-01 PROCEDURE — 82570 ASSAY OF URINE CREATININE: CPT

## 2020-01-01 PROCEDURE — 83036 HEMOGLOBIN GLYCOSYLATED A1C: CPT

## 2020-01-01 PROCEDURE — 85610 PROTHROMBIN TIME: CPT

## 2020-01-01 PROCEDURE — 82746 ASSAY OF FOLIC ACID SERUM: CPT

## 2020-01-01 PROCEDURE — 84100 ASSAY OF PHOSPHORUS: CPT

## 2020-01-01 PROCEDURE — 94002 VENT MGMT INPAT INIT DAY: CPT

## 2020-01-01 PROCEDURE — 99284 EMERGENCY DEPT VISIT MOD MDM: CPT

## 2020-01-01 PROCEDURE — 86803 HEPATITIS C AB TEST: CPT

## 2020-01-01 RX ORDER — ASCORBIC ACID 60 MG
500 TABLET,CHEWABLE ORAL DAILY
Refills: 0 | Status: DISCONTINUED | OUTPATIENT
Start: 2020-01-01 | End: 2020-01-01

## 2020-01-01 RX ORDER — CHLORHEXIDINE GLUCONATE 213 G/1000ML
1 SOLUTION TOPICAL
Refills: 0 | Status: DISCONTINUED | OUTPATIENT
Start: 2020-01-01 | End: 2020-01-01

## 2020-01-01 RX ORDER — THIAMINE MONONITRATE (VIT B1) 100 MG
200 TABLET ORAL
Refills: 0 | Status: DISCONTINUED | OUTPATIENT
Start: 2020-01-01 | End: 2020-01-01

## 2020-01-01 RX ORDER — NOREPINEPHRINE BITARTRATE/D5W 8 MG/250ML
0.05 PLASTIC BAG, INJECTION (ML) INTRAVENOUS
Qty: 16 | Refills: 0 | Status: DISCONTINUED | OUTPATIENT
Start: 2020-01-01 | End: 2020-01-01

## 2020-01-01 RX ORDER — SODIUM CHLORIDE 9 MG/ML
1000 INJECTION INTRAMUSCULAR; INTRAVENOUS; SUBCUTANEOUS
Refills: 0 | Status: DISCONTINUED | OUTPATIENT
Start: 2020-01-01 | End: 2020-01-01

## 2020-01-01 RX ORDER — FENTANYL CITRATE 50 UG/ML
0.5 INJECTION INTRAVENOUS
Qty: 2500 | Refills: 0 | Status: DISCONTINUED | OUTPATIENT
Start: 2020-01-01 | End: 2020-01-01

## 2020-01-01 RX ORDER — ANAKINRA 100MG/0.67
100 SYRINGE (ML) SUBCUTANEOUS
Refills: 0 | Status: DISCONTINUED | OUTPATIENT
Start: 2020-01-01 | End: 2020-01-01

## 2020-01-01 RX ORDER — HEPARIN SODIUM 5000 [USP'U]/ML
5000 INJECTION INTRAVENOUS; SUBCUTANEOUS EVERY 8 HOURS
Refills: 0 | Status: DISCONTINUED | OUTPATIENT
Start: 2020-01-01 | End: 2020-01-01

## 2020-01-01 RX ORDER — FENTANYL CITRATE 50 UG/ML
0.5 INJECTION INTRAVENOUS
Qty: 5000 | Refills: 0 | Status: DISCONTINUED | OUTPATIENT
Start: 2020-01-01 | End: 2020-01-01

## 2020-01-01 RX ORDER — ACETAMINOPHEN 500 MG
650 TABLET ORAL EVERY 6 HOURS
Refills: 0 | Status: DISCONTINUED | OUTPATIENT
Start: 2020-01-01 | End: 2020-01-01

## 2020-01-01 RX ORDER — CEFTRIAXONE 500 MG/1
1000 INJECTION, POWDER, FOR SOLUTION INTRAMUSCULAR; INTRAVENOUS ONCE
Refills: 0 | Status: COMPLETED | OUTPATIENT
Start: 2020-01-01 | End: 2020-01-01

## 2020-01-01 RX ORDER — PROPOFOL 10 MG/ML
10 INJECTION, EMULSION INTRAVENOUS
Qty: 1000 | Refills: 0 | Status: DISCONTINUED | OUTPATIENT
Start: 2020-01-01 | End: 2020-01-01

## 2020-01-01 RX ORDER — INSULIN GLARGINE 100 [IU]/ML
10 INJECTION, SOLUTION SUBCUTANEOUS EVERY MORNING
Refills: 0 | Status: DISCONTINUED | OUTPATIENT
Start: 2020-01-01 | End: 2020-01-01

## 2020-01-01 RX ORDER — GLUCAGON INJECTION, SOLUTION 0.5 MG/.1ML
1 INJECTION, SOLUTION SUBCUTANEOUS ONCE
Refills: 0 | Status: DISCONTINUED | OUTPATIENT
Start: 2020-01-01 | End: 2020-01-01

## 2020-01-01 RX ORDER — TAMSULOSIN HYDROCHLORIDE 0.4 MG/1
0.4 CAPSULE ORAL AT BEDTIME
Refills: 0 | Status: DISCONTINUED | OUTPATIENT
Start: 2020-01-01 | End: 2020-01-01

## 2020-01-01 RX ORDER — ZINC SULFATE TAB 220 MG (50 MG ZINC EQUIVALENT) 220 (50 ZN) MG
220 TAB ORAL DAILY
Refills: 0 | Status: DISCONTINUED | OUTPATIENT
Start: 2020-01-01 | End: 2020-01-01

## 2020-01-01 RX ORDER — INSULIN GLARGINE 100 [IU]/ML
15 INJECTION, SOLUTION SUBCUTANEOUS AT BEDTIME
Refills: 0 | Status: DISCONTINUED | OUTPATIENT
Start: 2020-01-01 | End: 2020-01-01

## 2020-01-01 RX ORDER — ASCORBIC ACID 60 MG
1500 TABLET,CHEWABLE ORAL
Refills: 0 | Status: COMPLETED | OUTPATIENT
Start: 2020-01-01 | End: 2020-01-01

## 2020-01-01 RX ORDER — PANTOPRAZOLE SODIUM 20 MG/1
40 TABLET, DELAYED RELEASE ORAL
Refills: 0 | Status: DISCONTINUED | OUTPATIENT
Start: 2020-01-01 | End: 2020-01-01

## 2020-01-01 RX ORDER — ROCURONIUM BROMIDE 10 MG/ML
50 VIAL (ML) INTRAVENOUS ONCE
Refills: 0 | Status: COMPLETED | OUTPATIENT
Start: 2020-01-01 | End: 2020-01-01

## 2020-01-01 RX ORDER — THIAMINE MONONITRATE (VIT B1) 100 MG
100 TABLET ORAL DAILY
Refills: 0 | Status: DISCONTINUED | OUTPATIENT
Start: 2020-01-01 | End: 2020-01-01

## 2020-01-01 RX ORDER — METFORMIN HYDROCHLORIDE 850 MG/1
1 TABLET ORAL
Qty: 0 | Refills: 0 | DISCHARGE

## 2020-01-01 RX ORDER — CHOLECALCIFEROL (VITAMIN D3) 125 MCG
400 CAPSULE ORAL DAILY
Refills: 0 | Status: DISCONTINUED | OUTPATIENT
Start: 2020-01-01 | End: 2020-01-01

## 2020-01-01 RX ORDER — DEXTROSE 50 % IN WATER 50 %
12.5 SYRINGE (ML) INTRAVENOUS ONCE
Refills: 0 | Status: DISCONTINUED | OUTPATIENT
Start: 2020-01-01 | End: 2020-01-01

## 2020-01-01 RX ORDER — INSULIN LISPRO 100/ML
VIAL (ML) SUBCUTANEOUS
Refills: 0 | Status: DISCONTINUED | OUTPATIENT
Start: 2020-01-01 | End: 2020-01-01

## 2020-01-01 RX ORDER — PANTOPRAZOLE SODIUM 20 MG/1
40 TABLET, DELAYED RELEASE ORAL DAILY
Refills: 0 | Status: DISCONTINUED | OUTPATIENT
Start: 2020-01-01 | End: 2020-01-01

## 2020-01-01 RX ORDER — INSULIN GLARGINE 100 [IU]/ML
5 INJECTION, SOLUTION SUBCUTANEOUS AT BEDTIME
Refills: 0 | Status: DISCONTINUED | OUTPATIENT
Start: 2020-01-01 | End: 2020-01-01

## 2020-01-01 RX ORDER — SODIUM BICARBONATE 1 MEQ/ML
50 SYRINGE (ML) INTRAVENOUS ONCE
Refills: 0 | Status: COMPLETED | OUTPATIENT
Start: 2020-01-01 | End: 2020-01-01

## 2020-01-01 RX ORDER — PIOGLITAZONE HYDROCHLORIDE 15 MG/1
1 TABLET ORAL
Qty: 0 | Refills: 0 | DISCHARGE

## 2020-01-01 RX ORDER — SIMVASTATIN 20 MG/1
20 TABLET, FILM COATED ORAL AT BEDTIME
Refills: 0 | Status: DISCONTINUED | OUTPATIENT
Start: 2020-01-01 | End: 2020-01-01

## 2020-01-01 RX ORDER — TAMSULOSIN HYDROCHLORIDE 0.4 MG/1
1 CAPSULE ORAL
Qty: 0 | Refills: 0 | DISCHARGE

## 2020-01-01 RX ORDER — AZITHROMYCIN 500 MG/1
500 TABLET, FILM COATED ORAL DAILY
Refills: 0 | Status: DISCONTINUED | OUTPATIENT
Start: 2020-01-01 | End: 2020-01-01

## 2020-01-01 RX ORDER — INFLUENZA VIRUS VACCINE 15; 15; 15; 15 UG/.5ML; UG/.5ML; UG/.5ML; UG/.5ML
0.5 SUSPENSION INTRAMUSCULAR ONCE
Refills: 0 | Status: DISCONTINUED | OUTPATIENT
Start: 2020-01-01 | End: 2020-01-01

## 2020-01-01 RX ORDER — DEXTROSE 50 % IN WATER 50 %
25 SYRINGE (ML) INTRAVENOUS ONCE
Refills: 0 | Status: DISCONTINUED | OUTPATIENT
Start: 2020-01-01 | End: 2020-01-01

## 2020-01-01 RX ORDER — PHENYLEPHRINE HYDROCHLORIDE 10 MG/ML
0.2 INJECTION INTRAVENOUS
Qty: 160 | Refills: 0 | Status: DISCONTINUED | OUTPATIENT
Start: 2020-01-01 | End: 2020-01-01

## 2020-01-01 RX ORDER — ERGOCALCIFEROL 1.25 MG/1
50000 CAPSULE ORAL
Refills: 0 | Status: DISCONTINUED | OUTPATIENT
Start: 2020-01-01 | End: 2020-01-01

## 2020-01-01 RX ORDER — CEFTRIAXONE 500 MG/1
1000 INJECTION, POWDER, FOR SOLUTION INTRAMUSCULAR; INTRAVENOUS EVERY 24 HOURS
Refills: 0 | Status: DISCONTINUED | OUTPATIENT
Start: 2020-01-01 | End: 2020-01-01

## 2020-01-01 RX ORDER — THIAMINE MONONITRATE (VIT B1) 100 MG
200 TABLET ORAL
Refills: 0 | Status: COMPLETED | OUTPATIENT
Start: 2020-01-01 | End: 2020-01-01

## 2020-01-01 RX ORDER — SODIUM CHLORIDE 9 MG/ML
1000 INJECTION, SOLUTION INTRAVENOUS
Refills: 0 | Status: DISCONTINUED | OUTPATIENT
Start: 2020-01-01 | End: 2020-01-01

## 2020-01-01 RX ORDER — DAPAGLIFLOZIN 10 MG/1
1 TABLET, FILM COATED ORAL
Qty: 0 | Refills: 0 | DISCHARGE

## 2020-01-01 RX ORDER — DEXTROSE 50 % IN WATER 50 %
15 SYRINGE (ML) INTRAVENOUS ONCE
Refills: 0 | Status: DISCONTINUED | OUTPATIENT
Start: 2020-01-01 | End: 2020-01-01

## 2020-01-01 RX ORDER — HYDROXYCHLOROQUINE SULFATE 200 MG
400 TABLET ORAL EVERY 12 HOURS
Refills: 0 | Status: COMPLETED | OUTPATIENT
Start: 2020-01-01 | End: 2020-01-01

## 2020-01-01 RX ORDER — MIDAZOLAM HYDROCHLORIDE 1 MG/ML
2 INJECTION, SOLUTION INTRAMUSCULAR; INTRAVENOUS ONCE
Refills: 0 | Status: DISCONTINUED | OUTPATIENT
Start: 2020-01-01 | End: 2020-01-01

## 2020-01-01 RX ORDER — HYDROXYCHLOROQUINE SULFATE 200 MG
TABLET ORAL
Refills: 0 | Status: COMPLETED | OUTPATIENT
Start: 2020-01-01 | End: 2020-01-01

## 2020-01-01 RX ORDER — EXENATIDE 250 UG/ML
0 INJECTION SUBCUTANEOUS
Qty: 0 | Refills: 0 | DISCHARGE

## 2020-01-01 RX ORDER — INSULIN LISPRO 100/ML
VIAL (ML) SUBCUTANEOUS EVERY 6 HOURS
Refills: 0 | Status: DISCONTINUED | OUTPATIENT
Start: 2020-01-01 | End: 2020-01-01

## 2020-01-01 RX ORDER — SODIUM CHLORIDE 9 MG/ML
10 INJECTION INTRAMUSCULAR; INTRAVENOUS; SUBCUTANEOUS
Refills: 0 | Status: DISCONTINUED | OUTPATIENT
Start: 2020-01-01 | End: 2020-01-01

## 2020-01-01 RX ORDER — HYDROXYCHLOROQUINE SULFATE 200 MG
200 TABLET ORAL EVERY 12 HOURS
Refills: 0 | Status: COMPLETED | OUTPATIENT
Start: 2020-01-01 | End: 2020-01-01

## 2020-01-01 RX ORDER — CHLORHEXIDINE GLUCONATE 213 G/1000ML
15 SOLUTION TOPICAL EVERY 12 HOURS
Refills: 0 | Status: DISCONTINUED | OUTPATIENT
Start: 2020-01-01 | End: 2020-01-01

## 2020-01-01 RX ORDER — INSULIN LISPRO 100/ML
VIAL (ML) SUBCUTANEOUS AT BEDTIME
Refills: 0 | Status: DISCONTINUED | OUTPATIENT
Start: 2020-01-01 | End: 2020-01-01

## 2020-01-01 RX ADMIN — CHLORHEXIDINE GLUCONATE 15 MILLILITER(S): 213 SOLUTION TOPICAL at 05:45

## 2020-01-01 RX ADMIN — Medication 1500 MILLIGRAM(S): at 12:15

## 2020-01-01 RX ADMIN — FENTANYL CITRATE 3.18 MICROGRAM(S)/KG/HR: 50 INJECTION INTRAVENOUS at 08:19

## 2020-01-01 RX ADMIN — SODIUM CHLORIDE 75 MILLILITER(S): 9 INJECTION INTRAMUSCULAR; INTRAVENOUS; SUBCUTANEOUS at 05:46

## 2020-01-01 RX ADMIN — PANTOPRAZOLE SODIUM 40 MILLIGRAM(S): 20 TABLET, DELAYED RELEASE ORAL at 09:41

## 2020-01-01 RX ADMIN — HEPARIN SODIUM 5000 UNIT(S): 5000 INJECTION INTRAVENOUS; SUBCUTANEOUS at 17:14

## 2020-01-01 RX ADMIN — CEFTRIAXONE 100 MILLIGRAM(S): 500 INJECTION, POWDER, FOR SOLUTION INTRAMUSCULAR; INTRAVENOUS at 18:05

## 2020-01-01 RX ADMIN — INSULIN GLARGINE 15 UNIT(S): 100 INJECTION, SOLUTION SUBCUTANEOUS at 21:30

## 2020-01-01 RX ADMIN — Medication 1500 MILLIGRAM(S): at 17:04

## 2020-01-01 RX ADMIN — AZITHROMYCIN 500 MILLIGRAM(S): 500 TABLET, FILM COATED ORAL at 13:30

## 2020-01-01 RX ADMIN — SODIUM CHLORIDE 75 MILLILITER(S): 9 INJECTION INTRAMUSCULAR; INTRAVENOUS; SUBCUTANEOUS at 21:31

## 2020-01-01 RX ADMIN — INSULIN GLARGINE 10 UNIT(S): 100 INJECTION, SOLUTION SUBCUTANEOUS at 12:06

## 2020-01-01 RX ADMIN — SIMVASTATIN 20 MILLIGRAM(S): 20 TABLET, FILM COATED ORAL at 22:13

## 2020-01-01 RX ADMIN — CHLORHEXIDINE GLUCONATE 15 MILLILITER(S): 213 SOLUTION TOPICAL at 05:29

## 2020-01-01 RX ADMIN — Medication 50 MILLIGRAM(S): at 15:00

## 2020-01-01 RX ADMIN — FENTANYL CITRATE 3.18 MICROGRAM(S)/KG/HR: 50 INJECTION INTRAVENOUS at 06:30

## 2020-01-01 RX ADMIN — Medication 1500 MILLIGRAM(S): at 05:45

## 2020-01-01 RX ADMIN — ERGOCALCIFEROL 50000 UNIT(S): 1.25 CAPSULE ORAL at 12:14

## 2020-01-01 RX ADMIN — Medication 1500 MILLIGRAM(S): at 00:06

## 2020-01-01 RX ADMIN — HEPARIN SODIUM 5000 UNIT(S): 5000 INJECTION INTRAVENOUS; SUBCUTANEOUS at 12:03

## 2020-01-01 RX ADMIN — Medication 2: at 18:29

## 2020-01-01 RX ADMIN — HEPARIN SODIUM 5000 UNIT(S): 5000 INJECTION INTRAVENOUS; SUBCUTANEOUS at 22:14

## 2020-01-01 RX ADMIN — Medication 200 MILLIGRAM(S): at 21:31

## 2020-01-01 RX ADMIN — SIMVASTATIN 20 MILLIGRAM(S): 20 TABLET, FILM COATED ORAL at 21:31

## 2020-01-01 RX ADMIN — MIDAZOLAM HYDROCHLORIDE 2 MILLIGRAM(S): 1 INJECTION, SOLUTION INTRAMUSCULAR; INTRAVENOUS at 15:30

## 2020-01-01 RX ADMIN — Medication 1500 MILLIGRAM(S): at 05:46

## 2020-01-01 RX ADMIN — HEPARIN SODIUM 5000 UNIT(S): 5000 INJECTION INTRAVENOUS; SUBCUTANEOUS at 05:19

## 2020-01-01 RX ADMIN — Medication 1500 MILLIGRAM(S): at 11:55

## 2020-01-01 RX ADMIN — Medication 1500 MILLIGRAM(S): at 16:53

## 2020-01-01 RX ADMIN — TAMSULOSIN HYDROCHLORIDE 0.4 MILLIGRAM(S): 0.4 CAPSULE ORAL at 21:31

## 2020-01-01 RX ADMIN — Medication 200 MILLIGRAM(S): at 08:13

## 2020-01-01 RX ADMIN — Medication 400 MILLIGRAM(S): at 09:12

## 2020-01-01 RX ADMIN — HEPARIN SODIUM 5000 UNIT(S): 5000 INJECTION INTRAVENOUS; SUBCUTANEOUS at 21:07

## 2020-01-01 RX ADMIN — PROPOFOL 3.81 MICROGRAM(S)/KG/MIN: 10 INJECTION, EMULSION INTRAVENOUS at 21:04

## 2020-01-01 RX ADMIN — Medication 100 MILLIGRAM(S): at 04:40

## 2020-01-01 RX ADMIN — PROPOFOL 3.81 MICROGRAM(S)/KG/MIN: 10 INJECTION, EMULSION INTRAVENOUS at 03:00

## 2020-01-01 RX ADMIN — PHENYLEPHRINE HYDROCHLORIDE 2.38 MICROGRAM(S)/KG/MIN: 10 INJECTION INTRAVENOUS at 22:26

## 2020-01-01 RX ADMIN — HEPARIN SODIUM 5000 UNIT(S): 5000 INJECTION INTRAVENOUS; SUBCUTANEOUS at 21:32

## 2020-01-01 RX ADMIN — PROPOFOL 3.81 MICROGRAM(S)/KG/MIN: 10 INJECTION, EMULSION INTRAVENOUS at 12:00

## 2020-01-01 RX ADMIN — CHLORHEXIDINE GLUCONATE 15 MILLILITER(S): 213 SOLUTION TOPICAL at 04:40

## 2020-01-01 RX ADMIN — Medication 50 MILLIEQUIVALENT(S): at 16:54

## 2020-01-01 RX ADMIN — PROPOFOL 3.81 MICROGRAM(S)/KG/MIN: 10 INJECTION, EMULSION INTRAVENOUS at 16:00

## 2020-01-01 RX ADMIN — Medication 2.98 MICROGRAM(S)/KG/MIN: at 21:09

## 2020-01-01 RX ADMIN — Medication 100 MILLIGRAM(S): at 23:00

## 2020-01-01 RX ADMIN — Medication 100 MILLIGRAM(S): at 21:31

## 2020-01-01 RX ADMIN — FENTANYL CITRATE 3.18 MICROGRAM(S)/KG/HR: 50 INJECTION INTRAVENOUS at 00:51

## 2020-01-01 RX ADMIN — Medication 2: at 22:14

## 2020-01-01 RX ADMIN — Medication 200 MILLIGRAM(S): at 09:12

## 2020-01-01 RX ADMIN — SIMVASTATIN 20 MILLIGRAM(S): 20 TABLET, FILM COATED ORAL at 21:07

## 2020-01-01 RX ADMIN — HEPARIN SODIUM 5000 UNIT(S): 5000 INJECTION INTRAVENOUS; SUBCUTANEOUS at 05:45

## 2020-01-01 RX ADMIN — Medication 100 MILLIGRAM(S): at 05:47

## 2020-01-01 RX ADMIN — FENTANYL CITRATE 3.18 MICROGRAM(S)/KG/HR: 50 INJECTION INTRAVENOUS at 16:36

## 2020-01-01 RX ADMIN — HEPARIN SODIUM 5000 UNIT(S): 5000 INJECTION INTRAVENOUS; SUBCUTANEOUS at 02:10

## 2020-01-01 RX ADMIN — SIMVASTATIN 20 MILLIGRAM(S): 20 TABLET, FILM COATED ORAL at 22:25

## 2020-01-01 RX ADMIN — FENTANYL CITRATE 3.18 MICROGRAM(S)/KG/HR: 50 INJECTION INTRAVENOUS at 16:52

## 2020-01-01 RX ADMIN — PROPOFOL 3.81 MICROGRAM(S)/KG/MIN: 10 INJECTION, EMULSION INTRAVENOUS at 04:39

## 2020-01-01 RX ADMIN — Medication 1500 MILLIGRAM(S): at 05:19

## 2020-01-01 RX ADMIN — Medication 400 UNIT(S): at 11:28

## 2020-01-01 RX ADMIN — PROPOFOL 3.81 MICROGRAM(S)/KG/MIN: 10 INJECTION, EMULSION INTRAVENOUS at 22:25

## 2020-01-01 RX ADMIN — SODIUM CHLORIDE 75 MILLILITER(S): 9 INJECTION INTRAMUSCULAR; INTRAVENOUS; SUBCUTANEOUS at 01:42

## 2020-01-01 RX ADMIN — Medication 80 MILLIGRAM(S): at 13:53

## 2020-01-01 RX ADMIN — Medication 2.98 MICROGRAM(S)/KG/MIN: at 13:32

## 2020-01-01 RX ADMIN — Medication 4: at 05:29

## 2020-01-01 RX ADMIN — SODIUM CHLORIDE 75 MILLILITER(S): 9 INJECTION, SOLUTION INTRAVENOUS at 10:36

## 2020-01-01 RX ADMIN — PROPOFOL 3.81 MICROGRAM(S)/KG/MIN: 10 INJECTION, EMULSION INTRAVENOUS at 08:19

## 2020-01-01 RX ADMIN — SIMVASTATIN 20 MILLIGRAM(S): 20 TABLET, FILM COATED ORAL at 02:11

## 2020-01-01 RX ADMIN — Medication 200 MILLIGRAM(S): at 22:24

## 2020-01-01 RX ADMIN — AZITHROMYCIN 500 MILLIGRAM(S): 500 TABLET, FILM COATED ORAL at 11:28

## 2020-01-01 RX ADMIN — Medication 200 MILLIGRAM(S): at 21:05

## 2020-01-01 RX ADMIN — FENTANYL CITRATE 3.18 MICROGRAM(S)/KG/HR: 50 INJECTION INTRAVENOUS at 22:28

## 2020-01-01 RX ADMIN — Medication 400 MILLIGRAM(S): at 02:10

## 2020-01-01 RX ADMIN — FENTANYL CITRATE 3.18 MICROGRAM(S)/KG/HR: 50 INJECTION INTRAVENOUS at 15:26

## 2020-01-01 RX ADMIN — Medication 8: at 12:03

## 2020-01-01 RX ADMIN — Medication 1500 MILLIGRAM(S): at 22:29

## 2020-01-01 RX ADMIN — Medication 2: at 17:04

## 2020-01-01 RX ADMIN — Medication 650 MILLIGRAM(S): at 22:00

## 2020-01-01 RX ADMIN — PROPOFOL 3.81 MICROGRAM(S)/KG/MIN: 10 INJECTION, EMULSION INTRAVENOUS at 05:45

## 2020-01-01 RX ADMIN — HEPARIN SODIUM 5000 UNIT(S): 5000 INJECTION INTRAVENOUS; SUBCUTANEOUS at 05:44

## 2020-01-01 RX ADMIN — Medication 200 MILLIGRAM(S): at 09:03

## 2020-01-01 RX ADMIN — HEPARIN SODIUM 5000 UNIT(S): 5000 INJECTION INTRAVENOUS; SUBCUTANEOUS at 05:29

## 2020-01-01 RX ADMIN — HEPARIN SODIUM 5000 UNIT(S): 5000 INJECTION INTRAVENOUS; SUBCUTANEOUS at 22:26

## 2020-01-01 RX ADMIN — HEPARIN SODIUM 5000 UNIT(S): 5000 INJECTION INTRAVENOUS; SUBCUTANEOUS at 05:57

## 2020-01-01 RX ADMIN — Medication 400 UNIT(S): at 21:31

## 2020-01-01 RX ADMIN — TAMSULOSIN HYDROCHLORIDE 0.4 MILLIGRAM(S): 0.4 CAPSULE ORAL at 21:32

## 2020-01-01 RX ADMIN — INSULIN GLARGINE 5 UNIT(S): 100 INJECTION, SOLUTION SUBCUTANEOUS at 22:33

## 2020-01-01 RX ADMIN — HEPARIN SODIUM 5000 UNIT(S): 5000 INJECTION INTRAVENOUS; SUBCUTANEOUS at 11:55

## 2020-01-01 RX ADMIN — FENTANYL CITRATE 3.18 MICROGRAM(S)/KG/HR: 50 INJECTION INTRAVENOUS at 23:10

## 2020-01-01 RX ADMIN — HEPARIN SODIUM 5000 UNIT(S): 5000 INJECTION INTRAVENOUS; SUBCUTANEOUS at 13:32

## 2020-01-01 RX ADMIN — CHLORHEXIDINE GLUCONATE 15 MILLILITER(S): 213 SOLUTION TOPICAL at 17:49

## 2020-01-01 RX ADMIN — Medication 1500 MILLIGRAM(S): at 01:38

## 2020-01-01 RX ADMIN — PANTOPRAZOLE SODIUM 40 MILLIGRAM(S): 20 TABLET, DELAYED RELEASE ORAL at 11:28

## 2020-01-01 RX ADMIN — PROPOFOL 3.81 MICROGRAM(S)/KG/MIN: 10 INJECTION, EMULSION INTRAVENOUS at 22:13

## 2020-01-01 RX ADMIN — ZINC SULFATE TAB 220 MG (50 MG ZINC EQUIVALENT) 220 MILLIGRAM(S): 220 (50 ZN) TAB at 17:15

## 2020-01-01 RX ADMIN — CHLORHEXIDINE GLUCONATE 15 MILLILITER(S): 213 SOLUTION TOPICAL at 16:55

## 2020-01-01 RX ADMIN — Medication 200 MILLIGRAM(S): at 08:25

## 2020-01-01 RX ADMIN — PHENYLEPHRINE HYDROCHLORIDE 2.38 MICROGRAM(S)/KG/MIN: 10 INJECTION INTRAVENOUS at 09:08

## 2020-01-01 RX ADMIN — TAMSULOSIN HYDROCHLORIDE 0.4 MILLIGRAM(S): 0.4 CAPSULE ORAL at 02:11

## 2020-01-01 RX ADMIN — HEPARIN SODIUM 5000 UNIT(S): 5000 INJECTION INTRAVENOUS; SUBCUTANEOUS at 21:31

## 2020-01-01 RX ADMIN — Medication 200 MILLIGRAM(S): at 08:26

## 2020-01-01 RX ADMIN — CHLORHEXIDINE GLUCONATE 15 MILLILITER(S): 213 SOLUTION TOPICAL at 16:52

## 2020-01-01 RX ADMIN — Medication 200 MILLIGRAM(S): at 02:11

## 2020-01-01 RX ADMIN — SODIUM CHLORIDE 75 MILLILITER(S): 9 INJECTION, SOLUTION INTRAVENOUS at 02:17

## 2020-01-01 RX ADMIN — Medication 1500 MILLIGRAM(S): at 18:51

## 2020-01-01 RX ADMIN — Medication 1500 MILLIGRAM(S): at 13:31

## 2020-01-01 RX ADMIN — PANTOPRAZOLE SODIUM 40 MILLIGRAM(S): 20 TABLET, DELAYED RELEASE ORAL at 18:51

## 2020-01-01 RX ADMIN — Medication 2: at 02:10

## 2020-01-01 RX ADMIN — Medication 400 UNIT(S): at 12:54

## 2020-01-01 RX ADMIN — Medication 2: at 00:51

## 2020-01-01 RX ADMIN — HEPARIN SODIUM 5000 UNIT(S): 5000 INJECTION INTRAVENOUS; SUBCUTANEOUS at 04:40

## 2020-01-01 RX ADMIN — PANTOPRAZOLE SODIUM 40 MILLIGRAM(S): 20 TABLET, DELAYED RELEASE ORAL at 13:31

## 2020-01-01 RX ADMIN — Medication 200 MILLIGRAM(S): at 10:20

## 2020-01-01 RX ADMIN — Medication 2.98 MICROGRAM(S)/KG/MIN: at 16:00

## 2020-01-01 RX ADMIN — Medication 200 MILLIGRAM(S): at 10:19

## 2020-01-01 RX ADMIN — Medication 100 MILLIGRAM(S): at 17:22

## 2020-01-01 RX ADMIN — PROPOFOL 3.81 MICROGRAM(S)/KG/MIN: 10 INJECTION, EMULSION INTRAVENOUS at 16:54

## 2020-01-01 RX ADMIN — Medication 3: at 12:32

## 2020-01-01 RX ADMIN — Medication 8: at 04:39

## 2020-01-01 RX ADMIN — SODIUM CHLORIDE 75 MILLILITER(S): 9 INJECTION INTRAMUSCULAR; INTRAVENOUS; SUBCUTANEOUS at 11:56

## 2020-01-01 RX ADMIN — INSULIN GLARGINE 15 UNIT(S): 100 INJECTION, SOLUTION SUBCUTANEOUS at 02:10

## 2020-01-01 RX ADMIN — Medication 1500 MILLIGRAM(S): at 05:57

## 2020-01-01 RX ADMIN — AZITHROMYCIN 500 MILLIGRAM(S): 500 TABLET, FILM COATED ORAL at 12:54

## 2020-01-01 RX ADMIN — Medication 40 MILLIGRAM(S): at 04:40

## 2020-01-01 RX ADMIN — HEPARIN SODIUM 5000 UNIT(S): 5000 INJECTION INTRAVENOUS; SUBCUTANEOUS at 15:45

## 2020-01-01 RX ADMIN — AZITHROMYCIN 500 MILLIGRAM(S): 500 TABLET, FILM COATED ORAL at 18:02

## 2020-01-01 RX ADMIN — Medication 1500 MILLIGRAM(S): at 17:49

## 2020-01-01 RX ADMIN — Medication 400 UNIT(S): at 13:33

## 2020-03-29 NOTE — ED PROVIDER NOTE - PROGRESS NOTE DETAILS
CXR with infiltrate.  O2 sat 90% with O2.  ABG done.  ICU called however floor admission recommended as patient is not intubated.

## 2020-03-29 NOTE — H&P ADULT - PROBLEM SELECTOR PLAN 8
IMPROVE VTE score: 3  Will manage with: Heparin S/C due to [place]    [ ] Previous VTE                                    3  [ ] Thrombophilia                                  2  [ ] Lower limb paralysis                        2  (unable to hold up >15 seconds)    [ ] Current Cancer (within 6 months)        2   [x] Immobilization > 24 hrs                    1  [ ] ICU/CCU stay > 24 hrs                      1  [x] Age > 60                                         1

## 2020-03-29 NOTE — H&P ADULT - PROBLEM SELECTOR PLAN 6
No documented dementia, however patient has poor memory  Follow up metabolic panel, B12, folate No documented dementia, however patient has poor memory  Concern for concurrent toxic metabolic encephalopathy given infection  Follow up metabolic panel, B12, folate

## 2020-03-29 NOTE — H&P ADULT - HISTORY OF PRESENT ILLNESS
74 year old  male from home, with PMHx of BPH, DM, GERD, HTN, HLD presenting with flu-like symptoms. Patient is AAOx1 to self and poor historian. Does not remember how he got to the emergency room, expressed wishes to return home. Patient denies all symptoms, however is noted to have cough. As per ED note, patient presented with flu-like symptoms, cough, and was brought in by EMS.     As per ED note, as per EMS, patient was hypoxic to 52% on room air and improved to 90% on nonrebreather. Chest X-ray showed left sided opacities. Labs significant for lymphopenia, predominant AST transaminitis, blood glucose > 300, and creatinine 1.54 with unknown baseline.    Attempted to reach patient's family members as per phone numbers document in chart, however son's number is not updated and spouse's number goes to voicemail. 74 year old  male from home, with PMHx of BPH, DM, GERD, HTN, HLD presenting with flu-like symptoms. Patient is AAOx1 to self and poor historian. Does not remember how he got to the emergency room, expressed wishes to return home. Patient denies all symptoms, however is noted to have cough. As per ED note, patient presented with flu-like symptoms, cough, and was brought in by EMS.     As per ED note, as per EMS, patient was hypoxic to 52% on room air and improved to 90% on nonrebreather. Chest X-ray showed left sided opacities. Labs significant for lymphopenia, predominant AST transaminitis, blood glucose > 300, and creatinine 1.54 with unknown baseline.    Attempted to reach patient's family members as per phone numbers document in chart, however son's number is not updated(Woman picked up endorsing that hospital is calling wrong number) and spouse's number goes to voicemail.

## 2020-03-29 NOTE — H&P ADULT - PROBLEM SELECTOR PLAN 3
Diabetic on pioglitazone, dapagliflozin, glimepiride, metformin  Started on mod scale sliding scale  Follow Up HgbA1C  Endo, Dr. Baca is patient's endocrinologist. Consulted for glucose control Diabetic on pioglitazone, dapagliflozin, glimepiride, metformin  Started on low corrective sliding scale  Follow Up HgbA1C  - Titrate insulin as based on units of insulin given  Dr. Keven Lemos is patient's endocrinologist. Consulted for glucose control Diabetic on pioglitazone, dapagliflozin, glimepiride, metformin  Started on low corrective sliding scale and 15units Lantus at bedtime  Last A1C around 8 as per Dr. Baca  - Start on 3units Humalog with meals if patient tolerates oral diet.  Follow Up HgbA1C  - Titrate insulin as based on units of insulin given  Endo, Dr. Baca is patient's endocrinologist. Consulted for glucose control.  Please give Dr. Baca a call if any help is required regarding patient's insulini regimen.

## 2020-03-29 NOTE — H&P ADULT - ASSESSMENT
74 year old male with BPH, DM, HTN, GERD, HLD who is a poor historian presented with reported flu like symptoms. Due to labs and imaging suspicious for COVID-19 as well as hypoxia, will admit for management of pneumonia 2/2 viral infection.    Patient on extensive medications, brought in a plastic bag at patient's bed side. Dr. Baca, Endocrinologist.  Unable to reach family members. 74 year old male with BPH, DM, HTN, GERD, HLD who is a poor historian presented with reported flu like symptoms. Due to labs and imaging suspicious for COVID-19 as well as hypoxia, will admit for management of pneumonia 2/2 viral infection.    Patient on multiple diabetic medications, brought in a plastic bag at patient's bed side. Dr. Baca is patient's outpatient Endocrinologist.    Unable to reach family members. Son's number in chart is received by a woman who is upset at getting multiple calls. Spouse's number goes to voicemail.

## 2020-03-29 NOTE — ED ADULT NURSE NOTE - OBJECTIVE STATEMENT
biba with difficulty breathing and fever and hypoxic on room air, on arrival awake following simple instructions, spo2 89% with 100 % oxygen via NRM

## 2020-03-29 NOTE — H&P ADULT - NSHPPHYSICALEXAM_GEN_ALL_CORE
General - Laying down on stretcher, comfortably, on nonrebreather, in no respiratory distress. Appears anxious  Cardiovascular - +s1/s2, regular, tachycardic  Lungs - Clear to ascultation, no use of accessory muscles. + Diffuse rhonchi, + coughing  Abdomen - Normal bowel sounds, abdomen soft and nontender  Extremities - No edema, cyanosis or clubbing  MusculoSkeletal - 5/5 strength, normal range of motion, no swollen or erythematous  joints.  Neurological – Alert and oriented x 1 General - Laying down on stretcher, comfortably, on nonrebreather, in no respiratory distress. Appears anxious  Cardiovascular - +s1/s2, regular, tachycardic  Lungs - Clear to ascultation, no use of accessory muscles. + Diffuse rhonchi, + coughing. Saturating 90% on NRB  Abdomen - Normal bowel sounds, abdomen soft and nontender  Extremities - No edema, cyanosis or clubbing  MusculoSkeletal - 5/5 strength, normal range of motion, no swollen or erythematous  joints.  Neurological – Alert and oriented x 1

## 2020-03-29 NOTE — ED ADULT NURSE NOTE - NSIMPLEMENTINTERV_GEN_ALL_ED
Implemented All Fall Risk Interventions:  [place] to call system. Call bell, personal items and telephone within reach. Instruct patient to call for assistance. Room bathroom lighting operational. Non-slip footwear when patient is off stretcher. Physically safe environment: no spills, clutter or unnecessary equipment. Stretcher in lowest position, wheels locked, appropriate side rails in place. Provide visual cue, wrist band, yellow gown, etc. Monitor gait and stability. Monitor for mental status changes and reorient to person, place, and time. Review medications for side effects contributing to fall risk. Reinforce activity limits and safety measures with patient and family.

## 2020-03-29 NOTE — H&P ADULT - PROBLEM SELECTOR PLAN 2
High suspicion for COVID-19 given lymphopenia, AST predominant transaminitis, hypoxia.  -COVID-19 PCR sent  - Started on hydroxychloroquine, thiamine, vitamin C  - Continue with oxygen supplementation.   - Acetaminophen as needed  Patient appears to have poor prognosis due to desaturation to 80-90 despite nonrebreather  Unable to reach family to discuss goals of care High suspicion for COVID-19 given lymphopenia, AST predominant transaminitis, hypoxia.  -COVID-19 PCR sent  - Started on hydroxychloroquine, thiamine, vitamin C  - Continue with oxygen supplementation.   - Azithromycin and Ceftriaxone for possible underlying bacterial pna  - Acetaminophen as needed  Patient appears to have poor prognosis due to desaturation to 80-90 despite nonrebreather  Unable to reach family to discuss goals of care High suspicion for COVID-19 given lymphopenia, AST predominant transaminitis, hypoxia.  -COVID-19 PCR sent  - Started on hydroxychloroquine, thiamine, vitamin C  - Continue with oxygen supplementation.   - Azithromycin and Ceftriaxone for possible underlying bacterial pna  - Acetaminophen as needed  - Trend LFTs  Patient appears to have poor prognosis due to desaturation to 80-90 despite nonrebreather  Unable to reach family to discuss goals of care

## 2020-03-29 NOTE — ED PROVIDER NOTE - CLINICAL SUMMARY MEDICAL DECISION MAKING FREE TEXT BOX
pt hypoxic to 52%, tachycardic to 119.  Likely COVID.  Pt on supplemental oxygen, labs CXR, admission.

## 2020-03-29 NOTE — H&P ADULT - PROBLEM SELECTOR PLAN 1
As per ED documentation, Patient desaturated to 57% on RA  - Saturating 90% on nonrebreather  - Patient with poor prognosis, was unable to reach family to obtain goals of care.  Patient to remain full code until decision made by family.

## 2020-03-29 NOTE — H&P ADULT - PROBLEM SELECTOR PLAN 7
Creatinine of 1.5  Unknown baseline creatinine  Will hold lisinopril, home medication for now. Creatinine of 1.5  As per Dr. Baca, patient has a baseline creatinine in normal ranges  - Will give a trial of gentle hydration- LR @ 75cc/hr for 12 hours.  - Primary team please reassess fluid needs in the AM  - Avoid unnecessary fluids to avoid precipitating ARDS  Will hold lisinopril, home medication for now.

## 2020-03-29 NOTE — ED PROVIDER NOTE - OBJECTIVE STATEMENT
75 y/o male with PMHx of DM presents with shortness of breath, cough, and flu like symptoms.  As per EMS, pt sating 52% on room air.  Sat goes up to 90% with NRB.  Pt with no known covid exposure.

## 2020-03-29 NOTE — ED PROVIDER NOTE - NSCAREINITIATED _GEN_ER
Pt was sent to the ED by [facility] for eval of left foot. As per NH documents an XR of the left foot shows oblique fracture involving the distal 5th metatarsal with slight separation of the fracture fragments, soft tissue swelling and mild plantar and posterior calcaneal spurring. Keith Haley(Attending) Pt was sent to the ED by [facility] for eval of left foot. As per NH documents an XR of the left foot shows oblique fracture involving the distal 5th metatarsal with slight separation of the fracture fragments, soft tissue swelling and mild plantar and posterior calcaneal spurring. patient states she fell . patient denies any head trauma or other injuries.

## 2020-03-30 NOTE — PROGRESS NOTE ADULT - PROBLEM SELECTOR PLAN 2
-COVID-19 PCR = Detected  - Started on hydroxychloroquine, thiamine, vitamin C  - Continue with oxygen supplementation, with Non Rebreather Mask.  - Azithromycin and Ceftriaxone for possible underlying bacterial pna  - Acetaminophen as needed  - Trend LFTs  Patient appears to have poor prognosis due to desaturation to 80-90 despite nonrebreather  Unable to reach family to discuss goals of care

## 2020-03-30 NOTE — CONSULT NOTE ADULT - SUBJECTIVE AND OBJECTIVE BOX
Patient is a 74y old  Male who presents with a chief complaint of Flu-like symptoms (29 Mar 2020 17:54)      Initial HPI on admission: 74 year old  male from home, with PMHx of BPH, DM, GERD, HTN, HLD presenting with flu-like symptoms. Patient is AAOx1 to self and poor historian. Does not remember how he got to the emergency room, expressed wishes to return home. Patient denies all symptoms, however is noted to have cough. As per ED note, patient presented with flu-like symptoms, cough, and was brought in by EMS. As per ED note, as per EMS, patient was hypoxic to 52% on room air and improved to 90% on nonrebreather. Chest X-ray showed left sided opacities. Labs significant for lymphopenia, predominant AST transaminitis, blood glucose > 300, and creatinine 1.54 with unknown baseline. Attempted to reach patient's family members as per phone numbers document in chart, however son's number is not updated(Woman picked up endorsing that hospital is calling wrong number) and spouse's number goes to voicemail. (29 Mar 2020 17:54)      BRIEF HOSPITAL COURSE: 74 year olf male admitted to medical floor for presumed COVID-19 infection. Saturating 90% on 100% non-rebreather. ICU was consulted for tachypnea. Pt was breathing around 35 per min. Overall not in distress.       PAST MEDICAL & SURGICAL HISTORY:  Diabetic neuropathy  Primary osteoarthritis of right hip  GERD (gastroesophageal reflux disease)  Kidney stones  BPH (benign prostatic hypertrophy)  Diabetes mellitus type II  Hyperlipidemia  HTN (hypertension)  History of total hip replacement: right-   History of transurethral resection of prostate:   History of inguinal hernia repair: left and right      Allergies  No Known Allergies      FAMILY HISTORY:  Diabetes mellitus, type 2        Medications:  acetaminophen   Tablet .. 650 milliGRAM(s) Oral every 6 hours PRN  ascorbic acid 1500 milliGRAM(s) Oral four times a day  azithromycin   Tablet 500 milliGRAM(s) Oral daily  cefTRIAXone   IVPB 1000 milliGRAM(s) IV Intermittent every 24 hours  heparin  Injectable 5000 Unit(s) SubCutaneous every 8 hours  hydroxychloroquine   Oral   hydroxychloroquine 200 milliGRAM(s) Oral every 12 hours  influenza   Vaccine 0.5 milliLiter(s) IntraMuscular once  insulin glargine Injectable (LANTUS) 15 Unit(s) SubCutaneous at bedtime  insulin lispro (HumaLOG) corrective regimen sliding scale   SubCutaneous three times a day before meals  insulin lispro (HumaLOG) corrective regimen sliding scale   SubCutaneous at bedtime  lactated ringers. 1000 milliLiter(s) IV Continuous <Continuous>  simvastatin 20 milliGRAM(s) Oral at bedtime  sodium chloride 0.9%. 1000 milliLiter(s) IV Continuous <Continuous>  tamsulosin 0.4 milliGRAM(s) Oral at bedtime  thiamine 200 milliGRAM(s) Oral <User Schedule>        Vital Signs Last 24 Hrs  T(C): 37.1 (30 Mar 2020 10:49), Max: 37.2 (30 Mar 2020 08:10)  T(F): 98.7 (30 Mar 2020 10:49), Max: 99 (30 Mar 2020 08:10)  HR: 100 (30 Mar 2020 10:49) (57 - 109)  BP: 135/75 (30 Mar 2020 10:49) (121/83 - 156/85)  BP(mean): --  RR: 40 (30 Mar 2020 10:49) (20 - 40)  SpO2: 88% (30 Mar 2020 10:49) (87% - 93%)    ABG - ( 30 Mar 2020 11:40 )  pH, Arterial: 7.47  pH, Blood: x     /  pCO2: 32    /  pO2: 71    / HCO3: 23    / Base Excess: 0.8   /  SaO2: 93                  LABS:                        16.5   8.26  )-----------( 333      ( 30 Mar 2020 10:57 )             49.4     03-30    137  |  103  |  38<H>  ----------------------------<  288<H>  4.6   |  23  |  1.17    Ca    8.7      30 Mar 2020 10:57  Phos  3.1     03-30  Mg     2.7     03-30    TPro  6.8  /  Alb  2.2<L>  /  TBili  0.7  /  DBili  x   /  AST  96<H>  /  ALT  57  /  AlkPhos  106  03-30          POCT Blood Glucose.: 263 mg/dL (30 Mar 2020 12:13)    PT/INR - ( 29 Mar 2020 12:19 )   PT: 13.5 sec;   INR: 1.19 ratio         PTT - ( 29 Mar 2020 12:19 )  PTT:33.6 sec  Urinalysis Basic - ( 30 Mar 2020 06:20 )    Color: Yellow / Appearance: Clear / S.020 / pH: x  Gluc: x / Ketone: Small  / Bili: Negative / Urobili: Negative   Blood: x / Protein: 100 / Nitrite: Negative   Leuk Esterase: Negative / RBC: 2-5 /HPF / WBC 0-2 /HPF   Sq Epi: x / Non Sq Epi: Few /HPF / Bacteria: Few /HPF          Physical Examination:.  GENERAL: Well developed, Elderly male, NAD  HEENT:  Normocephalic/Atraumatic, reactive light reflex, moist mucous membranes  NECK: Supple, no JVD  RESP: Symmetric movement of the chest, clear to auscultation bilaterally, no wheeze, no rhonchi, no crackles noted  CVS: S1 and S2 audible, no murmur, rubs or gallops noted  GI: Normal active bowel sounds present, abdomen soft, non tender, non distended  EXTREMITIES:  No edema, no clubbing, cyanosis  MSK: 4/5 strength bilateral upper and lower extremities, intact sensations to light & crude touch  PSYCH: Normal mood, normal affect observed  NEURO: Alert and oriented x 1            ASSESSMENT AND PLAN:  74 year olf male admitted to medical floor for presumed COVID-19 infection. ICU was consulted for acute hypoxic respiratory failure.       1) Acute hypoxic respiratory failure.  2) Multifocal viral pneumonia.    ABG was performed on 100% NRB. ABG - pH, Arterial: 7.47  pH, Blood: x     /  pCO2: 32    /  pO2: 71    / HCO3: 23    / Base Excess: 0.8   /  SaO2: 93. Currently breathing at 30 per min. Clinically stable on non-rebreather. Empirically started Plaquenil on admission. Add vitamin C, Zinc, Vitamin D and thiamine. Monitor on medical floor closely. No need of ICU level care. Primary team to discuss goals of care with family in this multiple comorbid patient and dementia.

## 2020-03-30 NOTE — PROGRESS NOTE ADULT - ATTENDING COMMENTS
Patient was examined and discussed with VIKAS Meléndez and with MICU resident and attending.     With non-rebreather he is saturating above 90%.  Will continue treatment and watch carefully.

## 2020-03-30 NOTE — PROGRESS NOTE ADULT - PROBLEM SELECTOR PLAN 3
Diabetic on pioglitazone, dapagliflozin, glimepiride, metformin  Started on low corrective sliding scale and 15units Lantus at bedtime  Last A1C around 8 as per Dr. Baca  - Start on 3units Humalog with meals if patient tolerates oral diet.  Follow Up HgbA1C  - Titrate insulin as based on units of insulin given  Endo, Dr. Baca is patient's endocrinologist. Consulted for glucose control.  Please give Dr. Baca a call if any help is required regarding patient's insulini regimen.

## 2020-03-30 NOTE — PROGRESS NOTE ADULT - PROBLEM SELECTOR PLAN 7
Creatinine of 1.5  As per Dr. Baca, patient has a baseline creatinine in normal ranges  - Will give a trial of gentle hydration- LR @ 75cc/hr for 12 hours.  - Primary team please reassess fluid needs in the AM  - Avoid unnecessary fluids to avoid precipitating ARDS  Will hold lisinopril, home medication for now.

## 2020-03-30 NOTE — PROGRESS NOTE ADULT - PROBLEM SELECTOR PLAN 1
- Saturating 88% on nonrebreather  - Patient with poor prognosis, was unable to reach family to obtain goals of care.  Patient to remain full code until decision made by family.

## 2020-03-30 NOTE — PROGRESS NOTE ADULT - SUBJECTIVE AND OBJECTIVE BOX
Medicine PA Note:    HPI:  74 year old  male from home, with PMHx of BPH, DM, GERD, HTN, HLD presenting with flu-like symptoms. Patient is AAOx1 to self and poor historian. Does not remember how he got to the emergency room, expressed wishes to return home. Patient denies all symptoms, however is noted to have cough. As per ED note, patient presented with flu-like symptoms, cough, and was brought in by EMS.     As per ED note, as per EMS, patient was hypoxic to 52% on room air and improved to 90% on nonrebreather. Chest X-ray showed left sided opacities. Labs significant for lymphopenia, predominant AST transaminitis, blood glucose > 300, and creatinine 1.54 with unknown baseline.    Attempted to reach patient's family members as per phone numbers document in chart, however son's number is not updated(Woman picked up endorsing that hospital is calling wrong number) and spouse's number goes to voicemail. (29 Mar 2020 17:54)      SUBJECTIVE / OVERNIGHT EVENTS:  Patient c/o SOB, dry cough.  Denies chills. No cp, abdominal pain , N/V/D    MEDICATIONS  (STANDING):  ascorbic acid 1500 milliGRAM(s) Oral four times a day  azithromycin   Tablet 500 milliGRAM(s) Oral daily  cefTRIAXone   IVPB 1000 milliGRAM(s) IV Intermittent every 24 hours  cholecalciferol 400 Unit(s) Oral daily  heparin  Injectable 5000 Unit(s) SubCutaneous every 8 hours  hydroxychloroquine   Oral   hydroxychloroquine 200 milliGRAM(s) Oral every 12 hours  influenza   Vaccine 0.5 milliLiter(s) IntraMuscular once  insulin glargine Injectable (LANTUS) 15 Unit(s) SubCutaneous at bedtime  insulin lispro (HumaLOG) corrective regimen sliding scale   SubCutaneous three times a day before meals  insulin lispro (HumaLOG) corrective regimen sliding scale   SubCutaneous at bedtime  lactated ringers. 1000 milliLiter(s) (75 mL/Hr) IV Continuous <Continuous>  simvastatin 20 milliGRAM(s) Oral at bedtime  sodium chloride 0.9%. 1000 milliLiter(s) (75 mL/Hr) IV Continuous <Continuous>  tamsulosin 0.4 milliGRAM(s) Oral at bedtime  thiamine 200 milliGRAM(s) Oral <User Schedule>  zinc sulfate 220 milliGRAM(s) Oral daily    MEDICATIONS  (PRN):  acetaminophen   Tablet .. 650 milliGRAM(s) Oral every 6 hours PRN Temp greater or equal to 38C (100.4F), Mild Pain (1 - 3)      Vital Signs Last 24 Hrs  T(C): 37.1 (30 Mar 2020 10:49), Max: 37.2 (30 Mar 2020 08:10)  T(F): 98.7 (30 Mar 2020 10:49), Max: 99 (30 Mar 2020 08:10)  HR: 100 (30 Mar 2020 10:49) (57 - 109)  BP: 135/75 (30 Mar 2020 10:49) (121/83 - 156/85)  BP(mean): --  RR: 40 (30 Mar 2020 10:49) (20 - 40)  SpO2: 88% (30 Mar 2020 10:49) (87% - 93%)  CAPILLARY BLOOD GLUCOSE      POCT Blood Glucose.: 263 mg/dL (30 Mar 2020 12:13)  POCT Blood Glucose.: 320 mg/dL (30 Mar 2020 01:38)    I&O's Summary      Physical Exam: General - Laying down on stretcher, on nonrebreather, exhibiting labored breathing, appears anxious  Cardiovascular - +s1/s2, regular, tachycardic  Lungs - Clear to ascultation, using accessory muscles. + Diffuse rhonchi, + coughing. Saturating 88% on NRB  Abdomen - Normal bowel sounds, abdomen soft and nontender  Extremities - No edema, cyanosis or clubbing  MusculoSkeletal - 5/5 strength, normal range of motion, no swollen or erythematous  joints. Neurological – Alert and oriented x 1	      LABS:  COVID-19 PCR . (20 @ 21:21)    COVID-19 PCR: Detected: All “detected” results on this new test are considered presumptively  positive results, are clinically actionable, and specimens will be  forwarded to [facility] [place] confirmation testing.  Another report (corrected report) will only be issued if discordant  results occur.  This test has been validated by [employer] to be accurate;  though it has not been FDA cleared/approved by the usual pathway.  As with all laboratory tests, results should be correlated with clinical  findings.                   16.5   8.26  )-----------( 333      ( 30 Mar 2020 10:57 )             49.4     03-30    137  |  103  |  38<H>  ----------------------------<  288<H>  4.6   |  23  |  1.17    Ca    8.7      30 Mar 2020 10:57  Phos  3.1       Mg     2.7         TPro  6.8  /  Alb  2.2<L>  /  TBili  0.7  /  DBili  x   /  AST  96<H>  /  ALT  57  /  AlkPhos  106      PT/INR - ( 29 Mar 2020 12:19 )   PT: 13.5 sec;   INR: 1.19 ratio         PTT - ( 29 Mar 2020 12:19 )  PTT:33.6 sec        Urinalysis Basic - ( 30 Mar 2020 06:20 )    Color: Yellow / Appearance: Clear / S.020 / pH: x  Gluc: x / Ketone: Small  / Bili: Negative / Urobili: Negative   Blood: x / Protein: 100 / Nitrite: Negative   Leuk Esterase: Negative / RBC: 2-5 /HPF / WBC 0-2 /HPF   Sq Epi: x / Non Sq Epi: Few /HPF / Bacteria: Few /HPF    < from: Xray Chest 1 View-PORTABLE IMMEDIATE (20 @ 13:52) >  INTERPRETATION:  CLINICAL INFORMATION: Shortness of Breath, Cough,  Fever. . .     EXAM: AP chest.    COMPARISON: None .    FINDINGS:  Left retrocardiac opacity suggesting atelectasis versus pneumonia.  No pleural effusions or pneumothorax.  The cardiomediastinal silhouette is within normal limits.      IMPRESSION: Left retrocardiac opacity suggesting atelectasis versus pneumonia.    < end of copied text >

## 2020-03-30 NOTE — PROGRESS NOTE ADULT - PROBLEM SELECTOR PLAN 6
No documented dementia, however patient has poor memory  Concern for concurrent toxic metabolic encephalopathy given infection  Follow up metabolic panel, B12, folate

## 2020-03-30 NOTE — CHART NOTE - NSCHARTNOTEFT_GEN_A_CORE
Medicine PA Note:    Family reached by phone:  Steve Reyes and his Wife Eloise Gutierrez)  [phone]    Patient's diagnosis and status updated and explained to patient.  Patient is a Diabetic who is currently COVID19 + pneumonia.  Patient is on a NRB Mask with 15LPM of 02 with 02 sat=88% via pulse ox, 02 sat=93% via ABG.  Possibility of worsening of patients condition, including rapid responses, Codes including CPR and Intubation discussed.  They would like all measures necessary taken to help the patients current condition.    Patients son, Steve Reyes, would like to be contacted via his wife's phone [phone].

## 2020-03-30 NOTE — PROGRESS NOTE ADULT - ASSESSMENT
74 year old male with BPH, DM, HTN, GERD, HLD who is a poor historian presented with reported flu like symptoms. Due to labs and imaging suspicious for COVID-19 as well as hypoxia, will admit for management of pneumonia 2/2 viral infection.    Patient on multiple diabetic medications, brought in a plastic bag at patient's bed side. Dr. Baca is patient's outpatient Endocrinologist.    Unable to reach family members. Son's number in chart is received by a woman who is upset at getting multiple calls. Spouse's number goes to voicemail.

## 2020-03-31 NOTE — PROGRESS NOTE ADULT - PROBLEM SELECTOR PLAN 1
- Saturating 94% on nonrebreather mask.  - Patient with poor prognosis, family aware regargding goals of care.  Patient to remain full code

## 2020-03-31 NOTE — PROGRESS NOTE ADULT - SUBJECTIVE AND OBJECTIVE BOX
Medicine PA Follow Up Note:    Spoke with patients son and his wife last night:  Family reached by phone:  Steve Reyes (son) and his Wife Eloise (Kimberlee)  [phone]  They would like all measures necessary taken to help the patients current condition. (Full Code)      SUBJECTIVE / OVERNIGHT EVENTS:  Patient seen and evaluated at bedside today.  tolerating NRB Mask better today, although at times he coughs which makes his breathing harder.  Patient c/o SOB, dry cough.  Denies Chest Pain, chills, abdominal pain , N/V/D    MEDICATIONS  (STANDING):  ascorbic acid 1500 milliGRAM(s) Oral four times a day  azithromycin   Tablet 500 milliGRAM(s) Oral daily  cholecalciferol 400 Unit(s) Oral daily  dextrose 5%. 1000 milliLiter(s) (50 mL/Hr) IV Continuous <Continuous>  dextrose 50% Injectable 12.5 Gram(s) IV Push once  dextrose 50% Injectable 25 Gram(s) IV Push once  dextrose 50% Injectable 25 Gram(s) IV Push once  ergocalciferol 63933 Unit(s) Oral <User Schedule>  heparin  Injectable 5000 Unit(s) SubCutaneous every 8 hours  hydroxychloroquine   Oral   hydroxychloroquine 200 milliGRAM(s) Oral every 12 hours  influenza   Vaccine 0.5 milliLiter(s) IntraMuscular once  insulin glargine Injectable (LANTUS) 15 Unit(s) SubCutaneous at bedtime  insulin lispro (HumaLOG) corrective regimen sliding scale   SubCutaneous three times a day before meals  insulin lispro (HumaLOG) corrective regimen sliding scale   SubCutaneous at bedtime  simvastatin 20 milliGRAM(s) Oral at bedtime  tamsulosin 0.4 milliGRAM(s) Oral at bedtime  thiamine 200 milliGRAM(s) Oral <User Schedule>  zinc sulfate 220 milliGRAM(s) Oral daily    MEDICATIONS  (PRN):  acetaminophen   Tablet .. 650 milliGRAM(s) Oral every 6 hours PRN Temp greater or equal to 38C (100.4F), Mild Pain (1 - 3)  dextrose 40% Gel 15 Gram(s) Oral once PRN Blood Glucose LESS THAN 70 milliGRAM(s)/deciLiter  glucagon  Injectable 1 milliGRAM(s) IntraMuscular once PRN Glucose <70 milliGRAM(s)/deciLiter  guaiFENesin   Syrup  (Sugar-Free) 100 milliGRAM(s) Oral every 6 hours PRN Cough      Vital Signs Last 24 Hrs  T(C): 36.7 (31 Mar 2020 08:20), Max: 37.4 (31 Mar 2020 00:03)  T(F): 98 (31 Mar 2020 08:20), Max: 99.3 (31 Mar 2020 00:03)  HR: 109 (31 Mar 2020 08:20) (100 - 109)  BP: 141/87 (31 Mar 2020 08:20) (137/82 - 150/84)  BP(mean): --  RR: 26 (31 Mar 2020 08:20) (26 - 28)  SpO2: 94% (31 Mar 2020 08:20) (93% - 97%)  CAPILLARY BLOOD GLUCOSE      POCT Blood Glucose.: 207 mg/dL (30 Mar 2020 21:09)    I&O's Summary    30 Mar 2020 07:01  -  31 Mar 2020 07:00  --------------------------------------------------------  IN: 150 mL / OUT: 150 mL / NET: 0 mL        PHYSICAL EXAM:  GENERAL: NAD, well-developed  EYES: EOMI, PERRLA, conjunctiva and sclera clear  CHEST/LUNG: Labored breathing,   HEART: Regular rate and rhythm; Clear to auscultation bilat.  ABDOMEN: Soft, Nontender, Nondistended;   EXTREMITIES:  2+ Peripheral Pulses, No clubbing, cyanosis, or edema  PSYCH: AAOx3  NEUROLOGY: non-focal  SKIN: No rashes or lesions    LABS:                        16.5   8.26  )-----------( 333      ( 30 Mar 2020 10:57 )             49.4     03-30    137  |  103  |  38<H>  ----------------------------<  288<H>  4.6   |  23  |  1.17    Ca    8.7      30 Mar 2020 10:57  Phos  3.1     03-30  Mg     2.7     03-30    TPro  6.8  /  Alb  2.2<L>  /  TBili  0.7  /  DBili  x   /  AST  96<H>  /  ALT  57  /  AlkPhos  106  03-30    Hemoglobin A1C, Whole Blood (03.30.20 @ 13:43)    Hemoglobin A1C, Whole Blood: 10.9: Method: Immunoassay       Reference Range                4.0-5.6%       High risk (prediabetic)        5.7-6.4%       Diabetic, diagnostic             >=6.5%       ADA diabetic treatment goal       <7.0%  The Hemoglobin A1c testing is NGSP-certified.Reference ranges are based  upon the 2010 recommendations of  the American Diabetes Association.  Interpretation may vary for children  and adolescents. %        Radiology Studies:  < from: Xray Chest 1 View-PORTABLE IMMEDIATE (03.29.20 @ 13:52) >  IMPRESSION: Left retrocardiac opacity suggesting atelectasis versus pneumonia.    < end of copied text >

## 2020-03-31 NOTE — PROGRESS NOTE ADULT - PROBLEM SELECTOR PLAN 3
Diabetic on pioglitazone, dapagliflozin, glimepiride, metformin (on hold in the hospital)  Started on low corrective sliding scale and 15units Lantus at bedtime  HgbA1C=10.9   - Start on 3units Humalog with meals if patient tolerates oral diet.  - Titrate insulin as based on units of insulin given  EndoDr. Baca is patient's endocrinologist. Consulted for glucose control.  Please give Dr. Baca a call if any help is required regarding patient's insulin regimen.

## 2020-03-31 NOTE — PROGRESS NOTE ADULT - PROBLEM SELECTOR PLAN 6
No documented dementia, however patient has poor memory  Concern for concurrent toxic metabolic encephalopathy given infection  E47=5423 (high), folate=17.1 (nl), Vit D=15.5 (low)-->supplement with PO Vit D

## 2020-03-31 NOTE — PROGRESS NOTE ADULT - ATTENDING COMMENTS
Plan d/w PA agree with plan as stated above.    74 year old male with hx of BPH, DM2, HTN, GERD, HLD and dementia who presented with worsening flu like symptoms, hypoxia and admitted with acute respiratory failure with hypoxia 2/2 viral pna 2/2 covid 19 pcr +ve. Pt remains on % unable to weaned, unable to have pt self prone bc of his cognitive state, d/w nursing if able to watch patient while in the room medicating will trial and mobilize oob to chair and prone. Pt continues on zithromax/ plaquenil and supportive care with thiamine/ vit c/ robitussin and tylenol prn. DM2 uncontrolled HBA1C: 11, currently with fs improving in the 200 range. C/w lantus/ HSS/hypoglycemia protocol. Diabetes education, will need to d/w family bc patient should be on injectable insulin, given hx of dementia will not be able to adminster this himself. At home pt takes po metformin/ dapagliflozin. HTN with stable bp, taken off of lisinopril due to interaction with covid. BPH to c/w flomax. DVT ppx to c/w heparin sq. Plan d/w PA agree with plan as stated above.    74 year old male with hx of BPH, DM2, HTN, GERD, HLD and dementia who presented with worsening flu like symptoms, hypoxia and admitted with acute respiratory failure with hypoxia 2/2 viral pna 2/2 covid 19 pcr +ve. Pt remains on % unable to weaned, unable to have pt self prone bc of his cognitive state, d/w nursing if able to watch patient while in the room medicating will trial and mobilize oob to chair and prone. Pt continues on zithromax/ plaquenil and supportive care with thiamine/ vit c/ robitussin and tylenol prn. CHINA secondary to prerenal azotemia/ dehydration s/p IVF. Improving renal fxn. DM2 uncontrolled HBA1C: 11, currently with fs improving in the 200 range. C/w lantus/ HSS/hypoglycemia protocol. Diabetes education, will need to d/w family bc patient should be on injectable insulin, given hx of dementia will not be able to adminster this himself. At home pt takes po metformin/ dapagliflozin. HTN with stable bp, taken off of lisinopril due to interaction with covid. BPH to c/w flomax. DVT ppx to c/w heparin sq. Plan d/w PA agree with plan as stated above.    74 year old male with hx of BPH, DM2, HTN, GERD, HLD and dementia who presented with worsening flu like symptoms, hypoxia and admitted with acute respiratory failure with hypoxia 2/2 viral pna 2/2 covid 19 pcr +ve. Pt remains on % unable to weaned, unable to have pt self prone bc of his cognitive state, d/w nursing if able to watch patient while in the room medicating will trial and mobilize oob to chair and prone. Pt continues on zithromax/ plaquenil and supportive care with thiamine/ vit c/ robitussin and tylenol prn. Will trend crp/ ferritin/ ldh and obtain baseline level. CHINA secondary to prerenal azotemia/ dehydration s/p IVF. Improving renal fxn. DM2 uncontrolled HBA1C: 11, currently with fs improving in the 200 range. C/w lantus/ HSS/hypoglycemia protocol. Diabetes education, will need to d/w family bc patient should be on injectable insulin, given hx of dementia will not be able to adminster this himself. At home pt takes po metformin/ dapagliflozin. HTN with stable bp, taken off of lisinopril due to interaction with covid. BPH to c/w flomax. DVT ppx to c/w heparin sq. Plan d/w PA agree with plan as stated above.    74 year old male with hx of BPH, DM2, HTN, GERD, HLD and dementia who presented with worsening flu like symptoms, hypoxia and admitted with acute respiratory failure with hypoxia 2/2 viral pna 2/2 covid 19 pcr +ve. Pt remains on % unable to weaned, unable to have pt self prone bc of his cognitive state, d/w nursing if able to watch patient while in the room medicating will trial and mobilize oob to chair and prone. Pt continues on zithromax/ plaquenil and supportive care with thiamine/ vit c/ robitussin and tylenol prn. Will trend crp/ ferritin/ ldh and obtain baseline level. [place] secondary to prerenal azotemia/ dehydration s/p IVF. Improving renal fxn. DM2 uncontrolled HBA1C: 11, currently with fs improving in the 200 range. C/w lantus/ HSS/hypoglycemia protocol. Diabetes education, will need to d/w family bc patient should be on injectable insulin, given hx of dementia will not be able to adminster this himself. At home pt takes po metformin/ dapagliflozin. HTN with stable bp, taken off of lisinopril due to interaction with covid. BPH to c/w flomax. DVT ppx to c/w heparin sq.  Advanced directive: Full code  Activity level: Bedrest to OOB to chair as tolerated

## 2020-03-31 NOTE — PROGRESS NOTE ADULT - PROBLEM SELECTOR PLAN 2
-COVID-19 PCR = Detected  - Started on hydroxychloroquine, thiamine, vitamin C  - Continue with oxygen supplementation, with Non Rebreather Mask, keep at 15LPM, patient did not tolerate 10LPM.  - Azithromycin and Hydrochloroquin for viral COVID19+ pneumonia  - Acetaminophen as needed  - Trend LFTs(ordered)

## 2020-03-31 NOTE — PROGRESS NOTE ADULT - PROBLEM SELECTOR PLAN 7
Creatinine of 1.17  improving after gentle hydration and increased PO intake  - Avoid unnecessary fluids to avoid precipitating ARDS  Will hold lisinopril, home medication for now.

## 2020-03-31 NOTE — PROGRESS NOTE ADULT - ASSESSMENT
74 year old male with BPH, DM, HTN, GERD, HLD who is a poor historian presented with reported flu like symptoms. Due to labs and imaging suspicious for COVID-19 as well as hypoxia, will admit for management of pneumonia 2/2 viral infection.    Family reached by phone:  Steve Reyes and his Wife Eloise Gutierrez)  [phone]    Patient's diagnosis and status updated and explained to patient.  Patient is a Diabetic who is currently COVID19 + pneumonia.  Patient is on a NRB Mask with 15LPM of 02 with 02 sat=94% via pulse ox today (3/31/2020 12pm)  Possibility of worsening of patients condition, including rapid responses, Codes including CPR and Intubation discussed.  They would like all measures necessary taken to help the patients current condition.    Patients son, Steve Reyes, would like to be contacted via his wife's phone [phone].

## 2020-04-01 NOTE — ACUTE INTERFACILITY TRANSFER NOTE - PLAN OF CARE
Fo resolution of pneumonia Continue antibiotics as ordered  Continue to maintain contact & airborne precaution resolution of pneumonia - pt off abx for now, pt finished course of plaquenil   Continue to maintain contact & airborne precaution  - continue vent setting as ordered  - f/u xrays and abg  - solumedrol as ordered and anakinra resolution of shawanda - monitor I and O  - no nephrotoxic meds  - bicarb 50x1  - monitor labs Blood glucose control - SSI

## 2020-04-01 NOTE — PROGRESS NOTE ADULT - SUBJECTIVE AND OBJECTIVE BOX
REYESPRETTY  74y  Male    Subjective:   Patient seen and evaluated at bedside. Still c/o cough and SOB. Tolerating NRB mask.      Vital Signs Last 24 Hrs  T(C): 36.8 (01 Apr 2020 08:10), Max: 37.7 (31 Mar 2020 17:21)  T(F): 98.2 (01 Apr 2020 08:10), Max: 99.9 (31 Mar 2020 17:21)  HR: 113 (01 Apr 2020 08:10) (68 - 113)  BP: 146/76 (01 Apr 2020 08:10) (123/70 - 146/76)  BP(mean): --  RR: 32 (01 Apr 2020 08:10) (25 - 32)  SpO2: 94% (01 Apr 2020 08:10) (90% - 96%)    PHYSICAL EXAM:  GENERAL: NAD, well-groomed, well-developed  HEENT - NC/AT, pupils equal and reactive to light,  ; Moist mucous membranes, Good dentition, No lesions  NECK: Supple, No JVD  EXTREMITIES:  2+ Peripheral Pulses, No clubbing, cyanosis, or edema  NEURO:  No Focal deficits, sensory and motor intact  SKIN: No rashes or lesions      MedsMEDICATIONS  (STANDING):  ascorbic acid 1500 milliGRAM(s) Oral four times a day  azithromycin   Tablet 500 milliGRAM(s) Oral daily  cholecalciferol 400 Unit(s) Oral daily  dextrose 5%. 1000 milliLiter(s) (50 mL/Hr) IV Continuous <Continuous>  dextrose 50% Injectable 12.5 Gram(s) IV Push once  dextrose 50% Injectable 25 Gram(s) IV Push once  dextrose 50% Injectable 25 Gram(s) IV Push once  ergocalciferol 95192 Unit(s) Oral <User Schedule>  heparin  Injectable 5000 Unit(s) SubCutaneous every 8 hours  hydroxychloroquine   Oral   hydroxychloroquine 200 milliGRAM(s) Oral every 12 hours  influenza   Vaccine 0.5 milliLiter(s) IntraMuscular once  insulin glargine Injectable (LANTUS) 15 Unit(s) SubCutaneous at bedtime  insulin lispro (HumaLOG) corrective regimen sliding scale   SubCutaneous three times a day before meals  insulin lispro (HumaLOG) corrective regimen sliding scale   SubCutaneous at bedtime  pantoprazole    Tablet 40 milliGRAM(s) Oral before breakfast  simvastatin 20 milliGRAM(s) Oral at bedtime  tamsulosin 0.4 milliGRAM(s) Oral at bedtime  thiamine 200 milliGRAM(s) Oral <User Schedule>  zinc sulfate 220 milliGRAM(s) Oral daily    MEDICATIONS  (PRN):  acetaminophen   Tablet .. 650 milliGRAM(s) Oral every 6 hours PRN Temp greater or equal to 38C (100.4F), Mild Pain (1 - 3)  dextrose 40% Gel 15 Gram(s) Oral once PRN Blood Glucose LESS THAN 70 milliGRAM(s)/deciLiter  glucagon  Injectable 1 milliGRAM(s) IntraMuscular once PRN Glucose <70 milliGRAM(s)/deciLiter  guaiFENesin   Syrup  (Sugar-Free) 100 milliGRAM(s) Oral every 6 hours PRN Cough      HEALTH ISSUES - PROBLEM Dx:  Need for prophylactic measure: Need for prophylactic measure  CHINA (acute kidney injury): CHINA (acute kidney injury)  Dementia: Dementia  GERD (gastroesophageal reflux disease): GERD (gastroesophageal reflux disease)  BPH (benign prostatic hyperplasia): BPH (benign prostatic hyperplasia)  Diabetes mellitus, type II: Diabetes mellitus, type II  Pneumonia, viral: Pneumonia, viral  Acute hypoxemic respiratory failure: Acute hypoxemic respiratory failure

## 2020-04-01 NOTE — CONSULT NOTE ADULT - ATTENDING COMMENTS
74 year old  male from home, with PMHx of BPH, DM, GERD, HTN, HLD presenting with flu-like symptoms. Patient is AAOx1 to self and poor historian. Does not remember how he got to the emergency room, expressed wishes to return home. Patient denies all symptoms, however is noted to have cough. As per ED note, patient presented with flu-like symptoms, cough, and was brought in by EMS. As per ED note, as per EMS, patient was hypoxic to 52% on room air and improved to 90% on nonrebreather. Chest X-ray showed left sided opacities. Labs significant for lymphopenia, predominant AST transaminitis, blood glucose > 300, and creatinine 1.54 with unknown baseline. Attempted to reach patient's family members as per phone numbers document in chart, however son's number is not updated(Woman picked up endorsing that hospital is calling wrong number) and spouse's number goes to voicemail. (29 Mar 2020 17:54)      BRIEF HOSPITAL COURSE: 74 year olf male admitted to medical floor for COVID-19 infection. Saturating 90% on 100% non-rebreather. ICU was consulted for tachypnea. Pt was breathing around 35 per min. Overall not in distress.     -Acute hypoxic resp failure  -B/L pna  -COVID-19 infection  -uncontrol DM      Plan:  -Plaquenil/vit c/d Zn thiamine and   -antibx  -O2 supp as need  -f/u cultures  -isolate .. airbourne and contact  -dvt/gi prophy  -monitor clinically, at this time no need for intubation  -re-consult    Plan as discussed
Assessment:  1. Acute respiratory failure  2. Severe sepsis  3. Viral pneumonia secondary to COVID 19  4. Diabetes mellitus    Plan  - Transfer patient to ICU  - Plan for intubation once in ICU  - Mechanical ventilation with lung protective strategy   - Titrate Fio2 and PEEP as tolerated  - Sedation for ventilator synchrony   - Vasopressor support to maintain MAP > 60  - Monitor ABG  - Hydoxychloroquine, Vitamin C and Thiamine   - Airborne and Contact isolation  - Basal/bolus insulin regimen  - Start tube feedings  - Monitor urine output  - Keep close to euvolemic volume status   - DVT and Stress Ulcer prophylaxis

## 2020-04-01 NOTE — ACUTE INTERFACILITY TRANSFER NOTE - HOSPITAL COURSE
74 year old  male from home, with PMHx of BPH, DM, GERD, HTN, HLD presenting with flu-like symptoms. Patient is AAOx1 to self and poor historian. Does not remember how he got to the emergency room, expressed wishes to return home. Patient denies all symptoms, however is noted to have cough. As per ED note, patient presented with flu-like symptoms, cough, and was brought in by EMS.     As per ED note, as per EMS, patient was hypoxic to 52% on room air and improved to 90% on nonrebreather. Chest X-ray showed left sided opacities. Labs significant for lymphopenia, predominant AST transaminitis, blood glucose > 300, and creatinine 1.54 with unknown baseline. Due to labs and imaging suspicious for COVID-19 as well as hypoxia, will admit for management of pneumonia 2/2 viral infection.     Plan: High suspicion for COVID-19 given lymphopenia, AST predominant transaminitis, hypoxia.  -COVID-19 PCR sent  - Started on hydroxychloroquine, thiamine, vitamin C  - Continue with oxygen supplementation.   - Azithromycin and Ceftriaxone for possible underlying bacterial pna  - Acetaminophen as needed    Patient appears to have poor prognosis due to desaturation to 80-90 despite nonrebreather  Unable to reach family to discuss goals of care. 74 year old  male from home, with PMHx of BPH, DM, GERD, HTN, HLD presenting with flu-like symptoms. Patient is AAOx1 to self and poor historian. Does not remember how he got to the emergency room, expressed wishes to return home. Patient denies all symptoms, however is noted to have cough. As per ED note, patient presented with flu-like symptoms, cough, and was brought in by EMS.     As per ED note, as per EMS, patient was hypoxic to 52% on room air and improved to 90% on nonrebreather. Chest X-ray showed left sided opacities. Labs significant for lymphopenia, predominant AST transaminitis, blood glucose > 300, and creatinine 1.54 with unknown baseline. Due to labs and imaging suspicious for COVID-19 as well as hypoxia, will admit for management of pneumonia 2/2 viral infection.  Pt with persistent hypoxemia and intubated.  Pt continues to vented and on levophed.  Vent settings: 450/32/60/8/  [place] 3. creatinine levels - 3.  No nephrotoxic meds.  Monitoring I&Os, and creatinine.  Bicarp x1 given.  Pt with elevated crp and ferritin.  Pt finished course of plaquenil. Started solumedrol and anakinra.  Pt is stable for transfer.

## 2020-04-01 NOTE — CONSULT NOTE ADULT - SUBJECTIVE AND OBJECTIVE BOX
HPI:  74 year old  male from home, with PMHx of BPH, DM, GERD, HTN, HLD presenting with flu-like symptoms. Patient is AAOx1 to self and poor historian. Does not remember how he got to the emergency room, expressed wishes to return home. Patient denies all symptoms, however is noted to have cough. As per ED note, patient presented with flu-like symptoms, cough, and was brought in by EMS.     As per ED note, as per EMS, patient was hypoxic to 52% on room air and improved to 90% on nonrebreather. Chest X-ray showed left sided opacities. Labs significant for lymphopenia, predominant AST transaminitis, blood glucose > 300, and creatinine 1.54 with unknown baseline.    Attempted to reach patient's family members as per phone numbers document in chart, however son's number is not updated(Woman picked up endorsing that hospital is calling wrong number) and spouse's number goes to voicemail. (29 Mar 2020 17:54)    Interval History: Patient was admitted with COVID 19 Pneumonia. Patient was to be respiratory distress this afternoon. O2 sat 70's on non breather   patient was tachypneic, using accessory muscle, diaphoretic. patient was evaluated by ICU team for respiratory failure      MEDICATIONS  (STANDING):  ascorbic acid 1500 milliGRAM(s) Oral four times a day  azithromycin   Tablet 500 milliGRAM(s) Oral daily  cholecalciferol 400 Unit(s) Oral daily  dextrose 5%. 1000 milliLiter(s) (50 mL/Hr) IV Continuous <Continuous>  dextrose 50% Injectable 12.5 Gram(s) IV Push once  dextrose 50% Injectable 25 Gram(s) IV Push once  dextrose 50% Injectable 25 Gram(s) IV Push once  heparin  Injectable 5000 Unit(s) SubCutaneous every 8 hours  hydroxychloroquine   Oral   hydroxychloroquine 200 milliGRAM(s) Oral every 12 hours  influenza   Vaccine 0.5 milliLiter(s) IntraMuscular once  insulin glargine Injectable (LANTUS) 5 Unit(s) SubCutaneous at bedtime  insulin lispro (HumaLOG) corrective regimen sliding scale   SubCutaneous three times a day before meals  insulin lispro (HumaLOG) corrective regimen sliding scale   SubCutaneous at bedtime  phenylephrine    Infusion 0.2 MICROgram(s)/kG/Min (2.38 mL/Hr) IV Continuous <Continuous>  propofol Infusion 10 MICROgram(s)/kG/Min (3.81 mL/Hr) IV Continuous <Continuous>  simvastatin 20 milliGRAM(s) Oral at bedtime  thiamine 200 milliGRAM(s) Oral <User Schedule>  zinc sulfate 220 milliGRAM(s) Oral daily    MEDICATIONS  (PRN):  acetaminophen   Tablet .. 650 milliGRAM(s) Oral every 6 hours PRN Temp greater or equal to 38C (100.4F), Mild Pain (1 - 3)  dextrose 40% Gel 15 Gram(s) Oral once PRN Blood Glucose LESS THAN 70 milliGRAM(s)/deciLiter    PAST MEDICAL & SURGICAL HISTORY:  Diabetic neuropathy  Primary osteoarthritis of right hip  GERD (gastroesophageal reflux disease)  Kidney stones  BPH (benign prostatic hypertrophy)  Diabetes mellitus type II  Hyperlipidemia  HTN (hypertension)  History of total hip replacement: right- 2005  History of transurethral resection of prostate: 2012  History of inguinal hernia repair: left and right      Vital Signs Last 24 Hrs  T(C): 36.6 (01 Apr 2020 12:42), Max: 37.7 (31 Mar 2020 17:21)  T(F): 97.9 (01 Apr 2020 12:42), Max: 99.9 (31 Mar 2020 17:21)  HR: 112 (01 Apr 2020 12:42) (68 - 113)  BP: 132/70 (01 Apr 2020 12:42) (123/70 - 146/76)  BP(mean): --  RR: 34 (01 Apr 2020 12:42) (25 - 34)  SpO2: 80% (01 Apr 2020 12:42) (80% - 96%)    PHYSICAL EXAM:  GENERAL: Mod respiratory distress   HEAD:  Atraumatic, Normocephalic  EYES: EOMI, PERRLA, conjunctiva and sclera clear  NECK: Supple, No JVD  CHEST/LUNG: decreased breath sounds b/l, no wheezing, B/l Rhonchi +  HEART: Regular rate and rhythm; No murmurs;   ABDOMEN: Soft, Nontender, distended; Bowel sounds present; No guarding  EXTREMITIES:  2+ Peripheral Pulses, No cyanosis or edema  NEUROLOGY: AAOx3 non-focal  SKIN: No rashes or lesions                            15.2   7.66  )-----------( 420      ( 01 Apr 2020 07:14 )             45.8     04-01    142  |  111<H>  |  27<H>  ----------------------------<  110<H>  4.1   |  20<L>  |  1.10    Ca    8.4      01 Apr 2020 07:14  Phos  3.6     04-01  Mg     2.8     04-01    TPro  6.5  /  Alb  1.9<L>  /  TBili  0.8  /  DBili  x   /  AST  59<H>  /  ALT  35  /  AlkPhos  109  04-01

## 2020-04-01 NOTE — PROGRESS NOTE ADULT - ATTENDING COMMENTS
Patient has had progressive SOB Patient has had progressive SOB.    Vital Signs Last 24 Hrs  T(C): 38.3 (01 Apr 2020 16:00), Max: 38.3 (01 Apr 2020 16:00)  T(F): 100.9 (01 Apr 2020 16:00), Max: 100.9 (01 Apr 2020 16:00)  HR: 122 (01 Apr 2020 16:00) (68 - 122)  BP: 132/70 (01 Apr 2020 12:42) (123/70 - 146/76)  BP(mean): --  RR: 34 (01 Apr 2020 16:00) (25 - 34)  SpO2: 96% (01 Apr 2020 16:00) (80% - 100%)  non-rebreather  Irregular respiratory excursion                        15.2   7.66  )-----------( 420      ( 01 Apr 2020 07:14 )             45.8   04-01    142  |  111<H>  |  27<H>  ----------------------------<  110<H>  4.1   |  20<L>  |  1.10    Ca    8.4      01 Apr 2020 07:14  Phos  3.6     04-01  Mg     2.8     04-01    TPro  6.5  /  Alb  1.9<L>  /  TBili  0.8  /  DBili  x   /  AST  59<H>  /  ALT  35  /  AlkPhos  109  04-01      pH, Arterial: 7.21 (04.01.20 @ 15:53)  pCO2, Arterial: 52 mmHg (04.01.20 @ 15:53)  pO2, Arterial: 258 mmHg (04.01.20 @ 15:53)  HCO3, Arterial: 20 mmol/L (04.01.20 @ 15:53)      IMP:  Combined respiratory and metabolic acidosis from COVID-19 pneumonia and sepsis.    Plan:  [facility]U has been consulted and will accept patient for ventilatory management.

## 2020-04-01 NOTE — CONSULT NOTE ADULT - ASSESSMENT
74 year old  male from home, with PMHx of BPH, DM, GERD, HTN, HLD presenting with flu-like symptoms. Patient is AAOx1 to self and poor historian. Does not remember how he got to the emergency room, expressed wishes to return home. Patient denies all symptoms, however is noted to have cough    Patient admitted to Hospital with Covid 19 Pneumonia  patient is been transferred to ICU for acute respiratory failure     Assessment and Plan     1. Neuro   Patient is sedated   on propofol     2. Pulmonary   Acute respiratory failure with Hypoxia   COVID 19+   s/p Intubation on MV   daily ABG  daily Xray   f/u CRP, D-dimer   c/w Azithro and Hydroxychloroquine  monitor Qtc daily      3. Cardio   pressor support PRN   EKG NSR     4. Renal   normal renal function   monitor BMP     5. GI   NPO for now   will place NG tube and start tube feeds    6. ENDO   patient with DM   siding scale   lantus 5 units bed time     7. ID   continue with COVID treatment     8. GI prophylaxis - protonix    DVT prophylaxis Heparin Sq 74 year old  male from home, with PMHx of BPH, DM, GERD, HTN, HLD presenting with flu-like symptoms. Patient is AAOx1 to self and poor historian. Does not remember how he got to the emergency room, expressed wishes to return home. Patient denies all symptoms, however is noted to have cough    Patient admitted to Hospital with Covid 19 Pneumonia  patient is been transferred to ICU for acute respiratory failure     Assessment and Plan     1. Neuro   Patient is sedated   on propofol     2. Pulmonary   Acute respiratory failure with Hypoxia   pH, Arterial: 7.49  pH, Blood: x     /  pCO2: 27    /  pO2: 41    / HCO3: 20    / Base Excess: -1.4  /  SaO2: 76      COVID 19+   s/p Intubation on MV   daily ABG  daily Xray   f/u CRP, D-dimer   c/w Azithro and Hydroxychloroquine  monitor Qtc daily      3. Cardio   pressor support PRN   EKG NSR     4. Renal   [place]  Monitor UO  monitor BMP     5. GI   NPO for now   will place NG tube and start tube feeds    6. ENDO   patient with DM   siding scale   lantus 5 units bed time     7. ID   continue with COVID treatment     8. GI prophylaxis - protonix    DVT prophylaxis Heparin Sq

## 2020-04-02 NOTE — PROGRESS NOTE ADULT - SUBJECTIVE AND OBJECTIVE BOX
INTERVAL /OVERNIGHT EVENTS:     PRESSORS: [ ] YES [ ] NO  WHICH:    ANTIBIOTICS:                  DATE STARTED:  ANTIBIOTICS:                  DATE STARTED:  ANTIBIOTICS:                  DATE STARTED:    Antimicrobial:  azithromycin   Tablet 500 milliGRAM(s) Oral daily  hydroxychloroquine   Oral   hydroxychloroquine 200 milliGRAM(s) Oral every 12 hours    Cardiovascular:  norepinephrine Infusion 0.05 MICROgram(s)/kG/Min IV Continuous <Continuous>    Pulmonary:    Hematalogic:  heparin  Injectable 5000 Unit(s) SubCutaneous every 8 hours    Other:  acetaminophen   Tablet .. 650 milliGRAM(s) Oral every 6 hours PRN  ascorbic acid 1500 milliGRAM(s) Oral four times a day  chlorhexidine 0.12% Liquid 15 milliLiter(s) Oral Mucosa every 12 hours  cholecalciferol 400 Unit(s) Oral daily  fentaNYL   Infusion. 0.5 MICROgram(s)/kG/Hr IV Continuous <Continuous>  influenza   Vaccine 0.5 milliLiter(s) IntraMuscular once  insulin lispro (HumaLOG) corrective regimen sliding scale   SubCutaneous every 6 hours  pantoprazole  Injectable 40 milliGRAM(s) IV Push daily  propofol Infusion 10 MICROgram(s)/kG/Min IV Continuous <Continuous>  simvastatin 20 milliGRAM(s) Oral at bedtime  sodium chloride 0.9% lock flush 10 milliLiter(s) IV Push every 1 hour PRN    acetaminophen   Tablet .. 650 milliGRAM(s) Oral every 6 hours PRN  ascorbic acid 1500 milliGRAM(s) Oral four times a day  azithromycin   Tablet 500 milliGRAM(s) Oral daily  chlorhexidine 0.12% Liquid 15 milliLiter(s) Oral Mucosa every 12 hours  cholecalciferol 400 Unit(s) Oral daily  fentaNYL   Infusion. 0.5 MICROgram(s)/kG/Hr IV Continuous <Continuous>  heparin  Injectable 5000 Unit(s) SubCutaneous every 8 hours  hydroxychloroquine   Oral   hydroxychloroquine 200 milliGRAM(s) Oral every 12 hours  influenza   Vaccine 0.5 milliLiter(s) IntraMuscular once  insulin lispro (HumaLOG) corrective regimen sliding scale   SubCutaneous every 6 hours  norepinephrine Infusion 0.05 MICROgram(s)/kG/Min IV Continuous <Continuous>  pantoprazole  Injectable 40 milliGRAM(s) IV Push daily  propofol Infusion 10 MICROgram(s)/kG/Min IV Continuous <Continuous>  simvastatin 20 milliGRAM(s) Oral at bedtime  sodium chloride 0.9% lock flush 10 milliLiter(s) IV Push every 1 hour PRN    Drug Dosing Weight  Height (cm): 165.1 (29 Mar 2020 11:47)  Weight (kg): 63.5 (29 Mar 2020 11:47)  BMI (kg/m2): 23.3 (29 Mar 2020 11:47)  BSA (m2): 1.7 (29 Mar 2020 11:47)    CENTRAL LINE: [ ] YES [ ] NO  LOCATION:   DATE INSERTED:  REMOVE: [ ] YES [ ] NO  EXPLAIN:    MOORE: [ ] YES [ ] NO    DATE INSERTED:  REMOVE:  [ ] YES [ ] NO  EXPLAIN:    A-LINE:  [ ] YES [ ] NO  LOCATION:   DATE INSERTED:  REMOVE:  [ ] YES [ ] NO  EXPLAIN:    PMH -reviewed admission note, no change since admission  Heart faliure: acute [ ] chronic [ ] acute or chronic [ ] diastolic [ ] systolic [ ] combied systolic and diastolic[ ]  [place]: ATN[ ] renal medullary necrosis [ ] CKD I [ ]CKDII [ ]CKD III [ ]CKD IV [ ]CKD V [ ]Other pathological lesions [ ]  Abdominal Nutrition Status: malnutrition [ ] cachexia [ ] morbid obesity/BMI=40 [ ] Supplement ordered [___________]     ICU Vital Signs Last 24 Hrs  T(C): 37.7 (02 Apr 2020 15:00), Max: 38.1 (02 Apr 2020 08:35)  T(F): 99.8 (02 Apr 2020 15:00), Max: 100.6 (02 Apr 2020 08:35)  HR: 86 (02 Apr 2020 15:00) (79 - 92)  BP: 93/55 (02 Apr 2020 15:00) (71/45 - 99/55)  BP(mean): 65 (02 Apr 2020 08:00) (51 - 65)  ABP: 107/59 (02 Apr 2020 15:00) (83/52 - 112/61)  ABP(mean): 79 (02 Apr 2020 15:00) (26 - 79)  RR: 30 (02 Apr 2020 15:00) (20 - 33)  SpO2: 89% (02 Apr 2020 15:00) (89% - 97%)      ABG - ( 02 Apr 2020 05:08 )  pH, Arterial: 7.26  pH, Blood: x     /  pCO2: 48    /  pO2: 98    / HCO3: 21    / Base Excess: -6.1  /  SaO2: 96                    04-01 @ 07:01  -  04-02 @ 07:00  --------------------------------------------------------  IN: 1646.5 mL / OUT: 850 mL / NET: 796.4 mL        Mode: AC/ CMV (Assist Control/ Continuous Mandatory Ventilation)  RR (machine): 30  TV (machine): 400  FiO2: 40  PEEP: 5  MAP: 10  PIP: 17      PHYSICAL EXAM:    GENERAL:   HEAD: atraumatic, normocephalic   EYES: PERRLA, white sclera.   ENMT: nasal mucosa- Oral cavity-   NECK: supple, JVD/ no JVD, thyroid-   LYMPH: no palpable lymph nodes     SKIN: warm, dry   CHEST/LUNG: ET tube: size        cm at lip. No Chest deformity , no chest tenderness. bilateral breath sounds,no  adventitious sounds  HEART: RRR, no m/r/g   ABDOMEN: soft, nontender, nondistended; bowel sounds.  :moore catheter.  EXTREMITIES: +1 non-pitting edema, no cyanosis, no clubbing.  NEURO: AA0X , mood/ affect-, no focal neuro deficits        LABS:  CBC Full  -  ( 02 Apr 2020 07:03 )  WBC Count : 10.83 K/uL  RBC Count : 4.74 M/uL  Hemoglobin : 13.9 g/dL  Hematocrit : 45.1 %  Platelet Count - Automated : 524 K/uL  Mean Cell Volume : 95.1 fl  Mean Cell Hemoglobin : 29.3 pg  Mean Cell Hemoglobin Concentration : 30.8 gm/dL  Auto Neutrophil # : x  Auto Lymphocyte # : x  Auto Monocyte # : x  Auto Eosinophil # : x  Auto Basophil # : x  Auto Neutrophil % : x  Auto Lymphocyte % : x  Auto Monocyte % : x  Auto Eosinophil % : x  Auto Basophil % : x    04-02    143  |  112<H>  |  37<H>  ----------------------------<  158<H>  4.6   |  17<L>  |  2.18<H>    Ca    7.8<L>      02 Apr 2020 07:03  Phos  6.1     04-02  Mg     2.8     04-02    TPro  6.4  /  Alb  1.6<L>  /  TBili  1.0  /  DBili  x   /  AST  69<H>  /  ALT  36  /  AlkPhos  142<H>  04-02            RADIOLOGY & ADDITIONAL STUDIES REVIEWED:     [ ]GOALS OF CARE DISCUSSION WITH PATIENT/FAMILY/PROXY:    CRITICAL CARE TIME SPENT: 35 minutes INTERVAL /OVERNIGHT EVENTS: sedated and intubated.      PRESSORS: [x] YES [ ] NO  WHICH: levo    ANTIBIOTICS:                  DATE STARTED:  ANTIBIOTICS:                  DATE STARTED:  ANTIBIOTICS:                  DATE STARTED:    Antimicrobial:  azithromycin   Tablet 500 milliGRAM(s) Oral daily  hydroxychloroquine   Oral   hydroxychloroquine 200 milliGRAM(s) Oral every 12 hours    Cardiovascular:  norepinephrine Infusion 0.05 MICROgram(s)/kG/Min IV Continuous <Continuous>    Pulmonary:    Hematalogic:  heparin  Injectable 5000 Unit(s) SubCutaneous every 8 hours    Other:  acetaminophen   Tablet .. 650 milliGRAM(s) Oral every 6 hours PRN  ascorbic acid 1500 milliGRAM(s) Oral four times a day  chlorhexidine 0.12% Liquid 15 milliLiter(s) Oral Mucosa every 12 hours  cholecalciferol 400 Unit(s) Oral daily  fentaNYL   Infusion. 0.5 MICROgram(s)/kG/Hr IV Continuous <Continuous>  influenza   Vaccine 0.5 milliLiter(s) IntraMuscular once  insulin lispro (HumaLOG) corrective regimen sliding scale   SubCutaneous every 6 hours  pantoprazole  Injectable 40 milliGRAM(s) IV Push daily  propofol Infusion 10 MICROgram(s)/kG/Min IV Continuous <Continuous>  simvastatin 20 milliGRAM(s) Oral at bedtime  sodium chloride 0.9% lock flush 10 milliLiter(s) IV Push every 1 hour PRN    acetaminophen   Tablet .. 650 milliGRAM(s) Oral every 6 hours PRN  ascorbic acid 1500 milliGRAM(s) Oral four times a day  azithromycin   Tablet 500 milliGRAM(s) Oral daily  chlorhexidine 0.12% Liquid 15 milliLiter(s) Oral Mucosa every 12 hours  cholecalciferol 400 Unit(s) Oral daily  fentaNYL   Infusion. 0.5 MICROgram(s)/kG/Hr IV Continuous <Continuous>  heparin  Injectable 5000 Unit(s) SubCutaneous every 8 hours  hydroxychloroquine   Oral   hydroxychloroquine 200 milliGRAM(s) Oral every 12 hours  influenza   Vaccine 0.5 milliLiter(s) IntraMuscular once  insulin lispro (HumaLOG) corrective regimen sliding scale   SubCutaneous every 6 hours  norepinephrine Infusion 0.05 MICROgram(s)/kG/Min IV Continuous <Continuous>  pantoprazole  Injectable 40 milliGRAM(s) IV Push daily  propofol Infusion 10 MICROgram(s)/kG/Min IV Continuous <Continuous>  simvastatin 20 milliGRAM(s) Oral at bedtime  sodium chloride 0.9% lock flush 10 milliLiter(s) IV Push every 1 hour PRN    Drug Dosing Weight  Height (cm): 165.1 (29 Mar 2020 11:47)  Weight (kg): 63.5 (29 Mar 2020 11:47)  BMI (kg/m2): 23.3 (29 Mar 2020 11:47)  BSA (m2): 1.7 (29 Mar 2020 11:47)    CENTRAL LINE: [x] YES [ ] NO  LOCATION:   DATE INSERTED:  REMOVE: [ ] YES [ ] NO  EXPLAIN:    MOORE: [x ] YES [ ] NO    DATE INSERTED:  REMOVE:  [ ] YES [ ] NO  EXPLAIN:    A-LINE:  [x ] YES [ ] NO  LOCATION:   DATE INSERTED:  REMOVE:  [ ] YES [ ] NO  EXPLAIN:      ICU Vital Signs Last 24 Hrs  T(C): 37.7 (02 Apr 2020 15:00), Max: 38.1 (02 Apr 2020 08:35)  T(F): 99.8 (02 Apr 2020 15:00), Max: 100.6 (02 Apr 2020 08:35)  HR: 86 (02 Apr 2020 15:00) (79 - 92)  BP: 93/55 (02 Apr 2020 15:00) (71/45 - 99/55)  BP(mean): 65 (02 Apr 2020 08:00) (51 - 65)  ABP: 107/59 (02 Apr 2020 15:00) (83/52 - 112/61)  ABP(mean): 79 (02 Apr 2020 15:00) (26 - 79)  RR: 30 (02 Apr 2020 15:00) (20 - 33)  SpO2: 89% (02 Apr 2020 15:00) (89% - 97%)      ABG - ( 02 Apr 2020 05:08 )  pH, Arterial: 7.26  pH, Blood: x     /  pCO2: 48    /  pO2: 98    / HCO3: 21    / Base Excess: -6.1  /  SaO2: 96                    04-01 @ 07:01  -  04-02 @ 07:00  --------------------------------------------------------  IN: 1646.5 mL / OUT: 850 mL / NET: 796.4 mL        Mode: AC/ CMV (Assist Control/ Continuous Mandatory Ventilation)  RR (machine): 30  TV (machine): 400  FiO2: 40  PEEP: 5  MAP: 10  PIP: 17      PHYSICAL EXAM:    GENERAL:   HEAD: atraumatic, normocephalic   ENMT: nasal mucosa- Oral cavity-   SKIN: warm, dry   CHEST/LUNG: ET tube in place, no wheezing   HEART: RRR, no m/r/g   ABDOMEN: soft, nontender, nondistended; bowel sounds.  : moore catheter.  EXTREMITIES: +1 non-pitting edema, no cyanosis, no clubbing.  NEURO: sedated         LABS:  CBC Full  -  ( 02 Apr 2020 07:03 )  WBC Count : 10.83 K/uL  RBC Count : 4.74 M/uL  Hemoglobin : 13.9 g/dL  Hematocrit : 45.1 %  Platelet Count - Automated : 524 K/uL  Mean Cell Volume : 95.1 fl  Mean Cell Hemoglobin : 29.3 pg  Mean Cell Hemoglobin Concentration : 30.8 gm/dL  Auto Neutrophil # : x  Auto Lymphocyte # : x  Auto Monocyte # : x  Auto Eosinophil # : x  Auto Basophil # : x  Auto Neutrophil % : x  Auto Lymphocyte % : x  Auto Monocyte % : x  Auto Eosinophil % : x  Auto Basophil % : x    04-02    143  |  112<H>  |  37<H>  ----------------------------<  158<H>  4.6   |  17<L>  |  2.18<H>    Ca    7.8<L>      02 Apr 2020 07:03  Phos  6.1     04-02  Mg     2.8     04-02    TPro  6.4  /  Alb  1.6<L>  /  TBili  1.0  /  DBili  x   /  AST  69<H>  /  ALT  36  /  AlkPhos  142<H>  04-02            RADIOLOGY & ADDITIONAL STUDIES REVIEWED:   < from: Xray Chest 1 View- PORTABLE-Routine (04.02.20 @ 11:26) >  EXAM:  XR CHEST PORTABLE ROUTINE 1V                            PROCEDURE DATE:  04/02/2020          INTERPRETATION:  Portable chest x-ray    Indication: COVID-19 pneumonia. Hypoxemia.    Portable chest x-ray is compared to previous examination dated 4/1/2020.    Impression: Stable ET tube, right IJ central venous catheter, NG tube and right PICC line.    Patchy airspace opacifications in both lungs, grossly unchanged on the right and improved on the left.    No evidence for pleural effusion or pneumothorax.    The trachea is midline.    Cardiac silhouette is within normal limits.    < end of copied text >  < from: Xray Chest 1 View- PORTABLE-Routine (04.02.20 @ 11:26) >  EXAM:  XR CHEST PORTABLE ROUTINE 1V                              [ ]GOALS OF CARE DISCUSSION WITH PATIENT/FAMILY/PROXY:    CRITICAL CARE TIME SPENT: 35 minutes

## 2020-04-03 NOTE — DIETITIAN INITIAL EVALUATION ADULT. - PERTINENT MEDS FT
MEDICATIONS  (STANDING):  anakinra Injectable 100 milliGRAM(s) SubCutaneous <User Schedule>  chlorhexidine 0.12% Liquid 15 milliLiter(s) Oral Mucosa every 12 hours  cholecalciferol 400 Unit(s) Oral daily  fentaNYL   Infusion. 0.5 MICROgram(s)/kG/Hr (3.18 mL/Hr) IV Continuous <Continuous>  heparin  Injectable 5000 Unit(s) SubCutaneous every 8 hours  influenza   Vaccine 0.5 milliLiter(s) IntraMuscular once  insulin lispro (HumaLOG) corrective regimen sliding scale   SubCutaneous every 6 hours  norepinephrine Infusion 0.05 MICROgram(s)/kG/Min (2.98 mL/Hr) IV Continuous <Continuous>  pantoprazole  Injectable 40 milliGRAM(s) IV Push daily  propofol Infusion 10 MICROgram(s)/kG/Min (3.81 mL/Hr) IV Continuous <Continuous>  simvastatin 20 milliGRAM(s) Oral at bedtime    MEDICATIONS  (PRN):  acetaminophen    Suspension .. 650 milliGRAM(s) Oral every 6 hours PRN Temp greater or equal to 38C (100.4F)  sodium chloride 0.9% lock flush 10 milliLiter(s) IV Push every 1 hour PRN Pre/post blood products, medications, blood draw, and to maintain line patency

## 2020-04-03 NOTE — DIETITIAN INITIAL EVALUATION ADULT. - OTHER INFO
unable to see pt due to Covid+. Flow records indicate TF At 20 ml/hr (Glucerna 1.5). Propofol @ 19 ml/hr (if x 24 hrs= 502 kcals fat. noted to have poor prognosis

## 2020-04-03 NOTE — DIETITIAN INITIAL EVALUATION ADULT. - PERTINENT LABORATORY DATA
04-03 Na142 mmol/L Glu 167 mg/dL<H> K+ 4.6 mmol/L Cr  3.40 mg/dL<H> BUN 49 mg/dL<H> 04-03 Phos 6.0 mg/dL<H> 04-03 Alb 1.4 g/dL<L> 03-31 [id number] 11.4 %<H> 03-30 Chol 111 mg/dL LDL 48 mg/dL HDL 19 mg/dL<L> Trig 218 mg/dL<H>

## 2020-04-03 NOTE — DIETITIAN INITIAL EVALUATION ADULT. - ENTERAL
with Propofol @ 19 ml/hr: Nepro 10x24 (240 ml, 432 kcals, 19 gm protein). May add 3 Pkts Prosource (+45 gm protein, 180 kcals). MD to monitor. RD available.

## 2020-04-03 NOTE — PROGRESS NOTE ADULT - SUBJECTIVE AND OBJECTIVE BOX
INTERVAL /OVERNIGHT EVENTS: 74 year old male intubated and on  pressors.  No acute complaints     PRESSORS: [x ] YES [ ] NO  WHICH:    Antimicrobial:  azithromycin   Tablet 500 milliGRAM(s) Oral daily    Cardiovascular:  norepinephrine Infusion 0.05 MICROgram(s)/kG/Min IV Continuous <Continuous>    Pulmonary:    Hematalogic:  heparin  Injectable 5000 Unit(s) SubCutaneous every 8 hours    Other:  acetaminophen    Suspension .. 650 milliGRAM(s) Oral every 6 hours PRN  chlorhexidine 0.12% Liquid 15 milliLiter(s) Oral Mucosa every 12 hours  cholecalciferol 400 Unit(s) Oral daily  fentaNYL   Infusion. 0.5 MICROgram(s)/kG/Hr IV Continuous <Continuous>  influenza   Vaccine 0.5 milliLiter(s) IntraMuscular once  insulin lispro (HumaLOG) corrective regimen sliding scale   SubCutaneous every 6 hours  pantoprazole  Injectable 40 milliGRAM(s) IV Push daily  propofol Infusion 10 MICROgram(s)/kG/Min IV Continuous <Continuous>  simvastatin 20 milliGRAM(s) Oral at bedtime  sodium chloride 0.9% lock flush 10 milliLiter(s) IV Push every 1 hour PRN    acetaminophen    Suspension .. 650 milliGRAM(s) Oral every 6 hours PRN  azithromycin   Tablet 500 milliGRAM(s) Oral daily  chlorhexidine 0.12% Liquid 15 milliLiter(s) Oral Mucosa every 12 hours  cholecalciferol 400 Unit(s) Oral daily  fentaNYL   Infusion. 0.5 MICROgram(s)/kG/Hr IV Continuous <Continuous>  heparin  Injectable 5000 Unit(s) SubCutaneous every 8 hours  influenza   Vaccine 0.5 milliLiter(s) IntraMuscular once  insulin lispro (HumaLOG) corrective regimen sliding scale   SubCutaneous every 6 hours  norepinephrine Infusion 0.05 MICROgram(s)/kG/Min IV Continuous <Continuous>  pantoprazole  Injectable 40 milliGRAM(s) IV Push daily  propofol Infusion 10 MICROgram(s)/kG/Min IV Continuous <Continuous>  simvastatin 20 milliGRAM(s) Oral at bedtime  sodium chloride 0.9% lock flush 10 milliLiter(s) IV Push every 1 hour PRN    Drug Dosing Weight  Height (cm): 165.1 (29 Mar 2020 11:47)  Weight (kg): 63.5 (29 Mar 2020 11:47)  BMI (kg/m2): 23.3 (29 Mar 2020 11:47)  BSA (m2): 1.7 (29 Mar 2020 11:47)    CENTRAL LINE: [x ] YES [ ] NO  LOCATION:   DATE INSERTED:  REMOVE: [ ] YES [ ] NO  EXPLAIN:    MOORE: [x ] YES [ ] NO    DATE INSERTED:  REMOVE:  [ ] YES [ ] NO  EXPLAIN:    A-LINE:  [x ] YES [ ] NO  LOCATION:   DATE INSERTED:  REMOVE:  [ ] YES [ ] NO  EXPLAIN:      ICU Vital Signs Last 24 Hrs  T(C): 37 (03 Apr 2020 08:00), Max: 38.6 (02 Apr 2020 20:00)  T(F): 98.6 (03 Apr 2020 08:00), Max: 101.5 (02 Apr 2020 20:00)  HR: 86 (03 Apr 2020 12:00) (86 - 93)  BP: 106/52 (03 Apr 2020 00:10) (93/55 - 106/52)  BP(mean): 65 (03 Apr 2020 00:10) (65 - 65)  ABP: 110/51 (03 Apr 2020 12:00) (103/50 - 114/52)  ABP(mean): 67 (03 Apr 2020 12:00) (63 - 79)  RR: 32 (03 Apr 2020 12:00) (30 - 32)  SpO2: 94% (03 Apr 2020 12:00) (89% - 95%)      ABG - ( 03 Apr 2020 05:24 )  pH, Arterial: 7.20  pH, Blood: x     /  pCO2: 48    /  pO2: 94    / HCO3: 18    / Base Excess: -9.5  /  SaO2: 96          04-02 @ 07:01  -  04-03 @ 07:00  --------------------------------------------------------  IN: 2442.8 mL / OUT: 650 mL / NET: 1792.8 mL        Mode: AC/ CMV (Assist Control/ Continuous Mandatory Ventilation)  RR (machine): 32  TV (machine): 400  FiO2: 60  PEEP: 8  ITime: 1  MAP: 15  PIP: 21      PHYSICAL EXAM:    GENERAL:   HEAD: atraumatic, normocephalic   ENMT: nasal mucosa- Oral cavity- dry  SKIN: warm, dry   CHEST/LUNG: ET tube in place  HEART: RRR, no m/r/g   ABDOMEN: soft, nontender, nondistended; bowel sounds.  :moore catheter.  EXTREMITIES: +1 non-pitting edema, no cyanosis, no clubbing.  NEURO: sedated        LABS:  CBC Full  -  ( 03 Apr 2020 07:01 )  WBC Count : 9.41 K/uL  RBC Count : 4.37 M/uL  Hemoglobin : 13.0 g/dL  Hematocrit : 42.3 %  Platelet Count - Automated : 483 K/uL  Mean Cell Volume : 96.8 fl  Mean Cell Hemoglobin : 29.7 pg  Mean Cell Hemoglobin Concentration : 30.7 gm/dL  Auto Neutrophil # : 7.56 K/uL  Auto Lymphocyte # : 0.63 K/uL  Auto Monocyte # : 0.68 K/uL  Auto Eosinophil # : 0.21 K/uL  Auto Basophil # : 0.03 K/uL  Auto Neutrophil % : 80.4 %  Auto Lymphocyte % : 6.7 %  Auto Monocyte % : 7.2 %  Auto Eosinophil % : 2.2 %  Auto Basophil % : 0.3 %    04-03    142  |  111<H>  |  49<H>  ----------------------------<  167<H>  4.6   |  17<L>  |  3.40<H>    Ca    7.8<L>      03 Apr 2020 07:01  Phos  6.0     04-03  Mg     3.0     04-03    TPro  6.1  /  Alb  1.4<L>  /  TBili  0.8  /  DBili  x   /  AST  41<H>  /  ALT  26  /  AlkPhos  144<H>  04-03            RADIOLOGY & ADDITIONAL STUDIES REVIEWED:   < from: Xray Chest 1 View- PORTABLE-Routine (04.03.20 @ 09:09) >    EXAM:  XR CHEST PORTABLE ROUTINE 1V                            PROCEDURE DATE:  04/03/2020          INTERPRETATION:  Portable chest x-ray    Indication: The patient is intubated.    Portable chest x-ray is compared to a previous examination dated 4/2/2020.    Impression: Stable ET tube, NG tube, and right IJ central venous catheter.    The left costophrenic angle is excluded from the radiograph. No gross pleural effusion or pneumothorax.    Airspace opacifications in both lungs, grossly unchanged on the right and slightly worsened on the left.    The trachea is midline.    Stable cardiac silhouette.    < end of copied text >      [ ]GOALS OF CARE DISCUSSION WITH PATIENT/FAMILY/PROXY:    CRITICAL CARE TIME SPENT: 35 minutes

## 2020-04-03 NOTE — PROGRESS NOTE ADULT - ASSESSMENT
1. Neuro   Patient is sedated   on propofol and fentanyl    2. Pulmonary   Acute respiratory failure with Hypoxia   pH, Arterial: 7.49  pH, Blood: x     /  pCO2: 27    /  pO2: 41    / HCO3: 20    / Base Excess: -1.4  /  SaO2: 76      COVID 19+   s/p Intubation on MV   daily ABG  daily Xray   f/u CRP, D-dimer   c/w Azithro and Hydroxychloroquine  monitor Qtc daily      3. Cardio   pressor support - on levo  EKG NSR     4. Renal   [place]  Monitor UO  monitor BMP     5. GI   ngt - on tf    6. ENDO   ssi    7. ID   continue with COVID treatment - on zithromax and plaq    8. GI prophylaxis - protonix    DVT prophylaxis Heparin Sq 1. Neuro   Patient is sedated   on propofol and fentanyl    2. Pulmonary   Acute respiratory failure with Hypoxia   pH, Arterial: 7.20 pH, Blood: x     /  pCO2: 48   /  pO2: 94    / HCO3:18    / Base Excess:  /  SaO2: 96     COVID 19+   s/p Intubation on MV   daily ABG  daily Xray   f/u CRP, D-dimer   c/w Azithro and Hydroxychloroquine  monitor Qtc daily      3. Cardio   pressor support - on levo  EKG NSR     4. Renal   [place]  Monitor UO  monitor BMP     5. GI   ngt - on tf    6. ENDO   ssi    7. ID   continue with COVID treatment - on zithromax and plaq    8. GI prophylaxis - protonix    DVT prophylaxis Heparin Sq 1. Neuro   Patient is sedated   on propofol and fentanyl    2. Pulmonary   Acute respiratory failure with Hypoxia   pH, Arterial: 7.20 pH, Blood: x     /  pCO2: 48   /  pO2: 94    / HCO3:18    / Base Excess:  /  SaO2: 96     COVID 19+   s/p Intubation on MV   daily ABG  daily Xray   f/u CRP, D-dimer   c/w Azithro and Hydroxychloroquine  monitor Qtc daily    - increased crp >30, ferritin  >700 - start solumedrol 80mg ivp stat and then solumedrol 40mg ivp q 12 hours     3. Cardio   pressor support - on levo  EKG NSR     4. Renal   [place]  Monitor UO  monitor BMP     5. GI   ngt - on tf    6. ENDO   ssi    7. ID   continue with COVID treatment - on zithromax and plaq    8. GI prophylaxis - protonix    DVT prophylaxis Heparin Sq

## 2020-04-04 NOTE — PROGRESS NOTE ADULT - ASSESSMENT
1. Neuro   Patient is sedated   on propofol and fentanyl    2. Pulmonary   Acute respiratory failure with Hypoxia   COVID 19+   s/p Intubation on MV   daily ABG  daily Xray   f/u CRP, D-dimer   c/w Azithro and Hydroxychloroquine  monitor Qtc daily    - increased crp >30, ferritin  >700 - start solumedrol 80mg ivp stat and then solumedrol 40mg ivp q 12 hours     3. Cardio   pressor support - on levo  EKG NSR     4. Renal   [place]  Monitor UO  monitor BMP     5. GI   ngt - on tf    6. ENDO   ssi    7. ID   continue with COVID treatment - on zithromax and plaq. anakinra    8. GI prophylaxis - protonix    DVT prophylaxis Heparin Sq

## 2020-04-04 NOTE — PROGRESS NOTE ADULT - SUBJECTIVE AND OBJECTIVE BOX
INTERVAL /OVERNIGHT EVENTS: 74 year old male intubated and on  pressors.  No acute complaints or interval changes.     PRESSORS: [x ] YES [ ] NO  WHICH:    Antimicrobial:  azithromycin   Tablet 500 milliGRAM(s) Oral daily    Cardiovascular:  norepinephrine Infusion 0.05 MICROgram(s)/kG/Min IV Continuous <Continuous>    Pulmonary:    Hematalogic:  heparin  Injectable 5000 Unit(s) SubCutaneous every 8 hours    Other:  anakinra 100mgSC daily  acetaminophen    Suspension .. 650 milliGRAM(s) Oral every 6 hours PRN  chlorhexidine 0.12% Liquid 15 milliLiter(s) Oral Mucosa every 12 hours  cholecalciferol 400 Unit(s) Oral daily  fentaNYL   Infusion. 0.5 MICROgram(s)/kG/Hr IV Continuous <Continuous>  influenza   Vaccine 0.5 milliLiter(s) IntraMuscular once  insulin lispro (HumaLOG) corrective regimen sliding scale   SubCutaneous every 6 hours  pantoprazole  Injectable 40 milliGRAM(s) IV Push daily  propofol Infusion 10 MICROgram(s)/kG/Min IV Continuous <Continuous>  simvastatin 20 milliGRAM(s) Oral at bedtime  sodium chloride 0.9% lock flush 10 milliLiter(s) IV Push every 1 hour PRN    acetaminophen    Suspension .. 650 milliGRAM(s) Oral every 6 hours PRN  azithromycin   Tablet 500 milliGRAM(s) Oral daily  chlorhexidine 0.12% Liquid 15 milliLiter(s) Oral Mucosa every 12 hours  cholecalciferol 400 Unit(s) Oral daily  fentaNYL   Infusion. 0.5 MICROgram(s)/kG/Hr IV Continuous <Continuous>  heparin  Injectable 5000 Unit(s) SubCutaneous every 8 hours  influenza   Vaccine 0.5 milliLiter(s) IntraMuscular once  insulin lispro (HumaLOG) corrective regimen sliding scale   SubCutaneous every 6 hours  norepinephrine Infusion 0.05 MICROgram(s)/kG/Min IV Continuous <Continuous>  pantoprazole  Injectable 40 milliGRAM(s) IV Push daily  propofol Infusion 10 MICROgram(s)/kG/Min IV Continuous <Continuous>  simvastatin 20 milliGRAM(s) Oral at bedtime  sodium chloride 0.9% lock flush 10 milliLiter(s) IV Push every 1 hour PRN    Drug Dosing Weight  Height (cm): 165.1 (29 Mar 2020 11:47)  Weight (kg): 63.5 (29 Mar 2020 11:47)  BMI (kg/m2): 23.3 (29 Mar 2020 11:47)  BSA (m2): 1.7 (29 Mar 2020 11:47)    CENTRAL LINE: [x ] YES [ ] NO  LOCATION:  right internal jugular  DATE INSERTED: 4/1/20  REMOVE: [ ] YES [x ] NO  EXPLAIN:    MOORE: [x ] YES [ ] NO    DATE INSERTED:  REMOVE:  [ ] YES [ ] NO  EXPLAIN:    A-LINE:  [x ] YES [ ] NO  LOCATION:   DATE INSERTED:  REMOVE:  [ ] YES [ ] NO  EXPLAIN:      ICU Vital Signs Last 24 Hrs  T(C): 37 (03 Apr 2020 08:00), Max: 38.6 (02 Apr 2020 20:00)  T(F): 98.6 (03 Apr 2020 08:00), Max: 101.5 (02 Apr 2020 20:00)  HR: 86 (03 Apr 2020 12:00) (86 - 93)  BP: 106/52 (03 Apr 2020 00:10) (93/55 - 106/52)  BP(mean): 65 (03 Apr 2020 00:10) (65 - 65)  ABP: 110/51 (03 Apr 2020 12:00) (103/50 - 114/52)  ABP(mean): 67 (03 Apr 2020 12:00) (63 - 79)  RR: 32 (03 Apr 2020 12:00) (30 - 32)  SpO2: 94% (03 Apr 2020 12:00) (89% - 95%)      ABG - ( 03 Apr 2020 05:24 )  pH, Arterial: 7.20  pH, Blood: x     /  pCO2: 48    /  pO2: 94    / HCO3: 18    / Base Excess: -9.5  /  SaO2: 96          04-02 @ 07:01  -  04-03 @ 07:00  --------------------------------------------------------  IN: 2442.8 mL / OUT: 650 mL / NET: 1792.8 mL        Mode: AC/ CMV (Assist Control/ Continuous Mandatory Ventilation)  RR (machine): 32  TV (machine): 400  FiO2: 60  PEEP: 8  ITime: 1  MAP: 15  PIP: 21      PHYSICAL EXAM:    GENERAL:   HEAD: atraumatic, normocephalic   ENMT: nasal mucosa- Oral cavity- dry  SKIN: warm, dry   CHEST/LUNG: ET tube in place  HEART: RRR, no m/r/g   ABDOMEN: soft, nontender, nondistended; bowel sounds.  :moore catheter.  EXTREMITIES: +1 non-pitting edema, no cyanosis, no clubbing.  NEURO: sedated            [x]GOALS OF CARE DISCUSSION WITH PATIENT/FAMILY/PROXY:    CRITICAL CARE TIME SPENT: 35 minutes

## 2022-04-29 NOTE — PATIENT PROFILE ADULT - HAS THE PATIENT EXPERIENCED ANY OF THE FOLLOWING WITHIN THE WEEK PRIOR TO ADMISSION?
1156 - called pt to get BP/HR/wt log from 4/23-4/29, no answer, left voicemail requesting return call. Will await return call. 5/2/22:  1607 - called pt to check on BP/HR/wt log 4/23-5/2, no answer, left voicemail requesting return call. Will await return call.     Josie Gill RN
no

## 2024-11-04 NOTE — PATIENT PROFILE ADULT - NSPROMUTANXFEARADDRESSFT_GEN_A_NUR
I refilled jardiance, but it won't let me do trulicity. Please refill trulicity to [facility].  Thanks.  
Patient requesting a order for a Echo be sent over to [facility] today. Volunteer completed echo on patient would like to be sure the findings were correct. Please contact once created    Fax# [phone]  
Pt new pharmacy still needs new prescriptions for these medications in order to finish transfering them:  empagliflozin (JARDIANCE) 25 MG tablet and   dulaglutide (TRULICITY) 1.5 MG/0.5ML SC injection   # [phone]  Thank You     
none